# Patient Record
Sex: MALE | Race: ASIAN | NOT HISPANIC OR LATINO | URBAN - METROPOLITAN AREA
[De-identification: names, ages, dates, MRNs, and addresses within clinical notes are randomized per-mention and may not be internally consistent; named-entity substitution may affect disease eponyms.]

---
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
## 2021-01-05 ENCOUNTER — NON-APPOINTMENT (OUTPATIENT)
Age: 21
End: 2021-01-05

## 2021-01-06 ENCOUNTER — NON-APPOINTMENT (OUTPATIENT)
Age: 21
End: 2021-01-06

## 2021-01-07 ENCOUNTER — NON-APPOINTMENT (OUTPATIENT)
Age: 21
End: 2021-01-07

## 2021-01-07 RX ORDER — CLONIDINE HYDROCHLORIDE 0.1 MG/1
0.1 TABLET ORAL TWICE DAILY
Qty: 60 | Refills: 1 | Status: COMPLETED | COMMUNITY
Start: 2020-11-04 | End: 2021-01-07

## 2021-07-15 ENCOUNTER — APPOINTMENT (OUTPATIENT)
Dept: PEDIATRIC DEVELOPMENTAL SERVICES | Facility: CLINIC | Age: 21
End: 2021-07-15
Payer: MEDICAID

## 2021-07-15 DIAGNOSIS — F84.0 AUTISTIC DISORDER: ICD-10-CM

## 2021-07-15 DIAGNOSIS — Q99.9 CHROMOSOMAL ABNORMALITY, UNSPECIFIED: ICD-10-CM

## 2021-07-15 DIAGNOSIS — F79 UNSPECIFIED INTELLECTUAL DISABILITIES: ICD-10-CM

## 2021-07-15 DIAGNOSIS — F91.3 OPPOSITIONAL DEFIANT DISORDER: ICD-10-CM

## 2021-07-15 DIAGNOSIS — F90.2 ATTENTION-DEFICIT HYPERACTIVITY DISORDER, COMBINED TYPE: ICD-10-CM

## 2021-07-15 PROCEDURE — 99215 OFFICE O/P EST HI 40 MIN: CPT | Mod: 95

## 2021-08-13 ENCOUNTER — NON-APPOINTMENT (OUTPATIENT)
Age: 21
End: 2021-08-13

## 2021-09-21 ENCOUNTER — NON-APPOINTMENT (OUTPATIENT)
Age: 21
End: 2021-09-21

## 2021-09-30 ENCOUNTER — NON-APPOINTMENT (OUTPATIENT)
Age: 21
End: 2021-09-30

## 2021-10-19 ENCOUNTER — NON-APPOINTMENT (OUTPATIENT)
Age: 21
End: 2021-10-19

## 2021-10-28 ENCOUNTER — NON-APPOINTMENT (OUTPATIENT)
Age: 21
End: 2021-10-28

## 2021-11-04 ENCOUNTER — NON-APPOINTMENT (OUTPATIENT)
Age: 21
End: 2021-11-04

## 2021-11-12 ENCOUNTER — NON-APPOINTMENT (OUTPATIENT)
Age: 21
End: 2021-11-12

## 2022-01-25 ENCOUNTER — NON-APPOINTMENT (OUTPATIENT)
Age: 22
End: 2022-01-25

## 2022-01-25 RX ORDER — LISDEXAMFETAMINE DIMESYLATE 70 MG/1
70 CAPSULE ORAL
Qty: 30 | Refills: 0 | Status: COMPLETED | COMMUNITY
Start: 2021-01-07 | End: 2022-01-25

## 2022-01-27 ENCOUNTER — NON-APPOINTMENT (OUTPATIENT)
Age: 22
End: 2022-01-27

## 2022-02-01 ENCOUNTER — NON-APPOINTMENT (OUTPATIENT)
Age: 22
End: 2022-02-01

## 2022-02-02 ENCOUNTER — NON-APPOINTMENT (OUTPATIENT)
Age: 22
End: 2022-02-02

## 2022-02-04 ENCOUNTER — NON-APPOINTMENT (OUTPATIENT)
Age: 22
End: 2022-02-04

## 2022-02-04 ENCOUNTER — EMERGENCY (EMERGENCY)
Facility: HOSPITAL | Age: 22
LOS: 1 days | Discharge: HOME | End: 2022-02-04
Attending: EMERGENCY MEDICINE | Admitting: EMERGENCY MEDICINE
Payer: MEDICAID

## 2022-02-04 VITALS
SYSTOLIC BLOOD PRESSURE: 121 MMHG | TEMPERATURE: 98 F | HEIGHT: 60 IN | OXYGEN SATURATION: 100 % | RESPIRATION RATE: 16 BRPM | HEART RATE: 78 BPM | DIASTOLIC BLOOD PRESSURE: 91 MMHG

## 2022-02-04 VITALS
OXYGEN SATURATION: 100 % | TEMPERATURE: 98 F | DIASTOLIC BLOOD PRESSURE: 77 MMHG | HEART RATE: 79 BPM | SYSTOLIC BLOOD PRESSURE: 125 MMHG | RESPIRATION RATE: 18 BRPM

## 2022-02-04 LAB
ALBUMIN SERPL ELPH-MCNC: 4.8 G/DL — SIGNIFICANT CHANGE UP (ref 3.3–5)
ALP SERPL-CCNC: 63 U/L — SIGNIFICANT CHANGE UP (ref 40–120)
ALT FLD-CCNC: 27 U/L — SIGNIFICANT CHANGE UP (ref 4–41)
ANION GAP SERPL CALC-SCNC: 16 MMOL/L — HIGH (ref 7–14)
AST SERPL-CCNC: 52 U/L — HIGH (ref 4–40)
BASOPHILS # BLD AUTO: 0.03 K/UL — SIGNIFICANT CHANGE UP (ref 0–0.2)
BASOPHILS NFR BLD AUTO: 0.4 % — SIGNIFICANT CHANGE UP (ref 0–2)
BILIRUB SERPL-MCNC: 0.7 MG/DL — SIGNIFICANT CHANGE UP (ref 0.2–1.2)
BUN SERPL-MCNC: 8 MG/DL — SIGNIFICANT CHANGE UP (ref 7–23)
CALCIUM SERPL-MCNC: 10.7 MG/DL — HIGH (ref 8.4–10.5)
CHLORIDE SERPL-SCNC: 100 MMOL/L — SIGNIFICANT CHANGE UP (ref 98–107)
CO2 SERPL-SCNC: 25 MMOL/L — SIGNIFICANT CHANGE UP (ref 22–31)
CREAT SERPL-MCNC: 0.8 MG/DL — SIGNIFICANT CHANGE UP (ref 0.5–1.3)
EOSINOPHIL # BLD AUTO: 0.09 K/UL — SIGNIFICANT CHANGE UP (ref 0–0.5)
EOSINOPHIL NFR BLD AUTO: 1.2 % — SIGNIFICANT CHANGE UP (ref 0–6)
GLUCOSE SERPL-MCNC: 99 MG/DL — SIGNIFICANT CHANGE UP (ref 70–99)
HCT VFR BLD CALC: 46.5 % — SIGNIFICANT CHANGE UP (ref 39–50)
HGB BLD-MCNC: 15.7 G/DL — SIGNIFICANT CHANGE UP (ref 13–17)
IANC: 3.85 K/UL — SIGNIFICANT CHANGE UP (ref 1.5–8.5)
IMM GRANULOCYTES NFR BLD AUTO: 0.1 % — SIGNIFICANT CHANGE UP (ref 0–1.5)
LIDOCAIN IGE QN: 42 U/L — SIGNIFICANT CHANGE UP (ref 7–60)
LYMPHOCYTES # BLD AUTO: 2.99 K/UL — SIGNIFICANT CHANGE UP (ref 1–3.3)
LYMPHOCYTES # BLD AUTO: 38.4 % — SIGNIFICANT CHANGE UP (ref 13–44)
MCHC RBC-ENTMCNC: 29.1 PG — SIGNIFICANT CHANGE UP (ref 27–34)
MCHC RBC-ENTMCNC: 33.8 GM/DL — SIGNIFICANT CHANGE UP (ref 32–36)
MCV RBC AUTO: 86.3 FL — SIGNIFICANT CHANGE UP (ref 80–100)
MONOCYTES # BLD AUTO: 0.82 K/UL — SIGNIFICANT CHANGE UP (ref 0–0.9)
MONOCYTES NFR BLD AUTO: 10.5 % — SIGNIFICANT CHANGE UP (ref 2–14)
NEUTROPHILS # BLD AUTO: 3.85 K/UL — SIGNIFICANT CHANGE UP (ref 1.8–7.4)
NEUTROPHILS NFR BLD AUTO: 49.4 % — SIGNIFICANT CHANGE UP (ref 43–77)
NRBC # BLD: 0 /100 WBCS — SIGNIFICANT CHANGE UP
NRBC # FLD: 0 K/UL — SIGNIFICANT CHANGE UP
PLATELET # BLD AUTO: 218 K/UL — SIGNIFICANT CHANGE UP (ref 150–400)
POTASSIUM SERPL-MCNC: 5.2 MMOL/L — SIGNIFICANT CHANGE UP (ref 3.5–5.3)
POTASSIUM SERPL-SCNC: 5.2 MMOL/L — SIGNIFICANT CHANGE UP (ref 3.5–5.3)
PROT SERPL-MCNC: 8.4 G/DL — HIGH (ref 6–8.3)
RBC # BLD: 5.39 M/UL — SIGNIFICANT CHANGE UP (ref 4.2–5.8)
RBC # FLD: 14.3 % — SIGNIFICANT CHANGE UP (ref 10.3–14.5)
SARS-COV-2 RNA SPEC QL NAA+PROBE: SIGNIFICANT CHANGE UP
SODIUM SERPL-SCNC: 141 MMOL/L — SIGNIFICANT CHANGE UP (ref 135–145)
WBC # BLD: 7.79 K/UL — SIGNIFICANT CHANGE UP (ref 3.8–10.5)
WBC # FLD AUTO: 7.79 K/UL — SIGNIFICANT CHANGE UP (ref 3.8–10.5)

## 2022-02-04 PROCEDURE — 99285 EMERGENCY DEPT VISIT HI MDM: CPT

## 2022-02-04 PROCEDURE — 74177 CT ABD & PELVIS W/CONTRAST: CPT | Mod: 26,MA

## 2022-02-04 RX ORDER — SODIUM CHLORIDE 9 MG/ML
1000 INJECTION, SOLUTION INTRAVENOUS ONCE
Refills: 0 | Status: COMPLETED | OUTPATIENT
Start: 2022-02-04 | End: 2022-02-04

## 2022-02-04 RX ORDER — FAMOTIDINE 10 MG/ML
20 INJECTION INTRAVENOUS ONCE
Refills: 0 | Status: COMPLETED | OUTPATIENT
Start: 2022-02-04 | End: 2022-02-04

## 2022-02-04 RX ORDER — ACETAMINOPHEN 500 MG
1000 TABLET ORAL ONCE
Refills: 0 | Status: COMPLETED | OUTPATIENT
Start: 2022-02-04 | End: 2022-02-04

## 2022-02-04 RX ORDER — MINERAL OIL
133 OIL (ML) MISCELLANEOUS ONCE
Refills: 0 | Status: COMPLETED | OUTPATIENT
Start: 2022-02-04 | End: 2022-02-04

## 2022-02-04 RX ADMIN — SODIUM CHLORIDE 1000 MILLILITER(S): 9 INJECTION, SOLUTION INTRAVENOUS at 18:43

## 2022-02-04 RX ADMIN — Medication 1000 MILLIGRAM(S): at 19:34

## 2022-02-04 RX ADMIN — Medication 133 MILLILITER(S): at 21:49

## 2022-02-04 RX ADMIN — FAMOTIDINE 20 MILLIGRAM(S): 10 INJECTION INTRAVENOUS at 18:43

## 2022-02-04 RX ADMIN — Medication 400 MILLIGRAM(S): at 18:44

## 2022-02-04 NOTE — ED ADULT NURSE NOTE - NSIMPLEMENTINTERV_GEN_ALL_ED
Implemented All Universal Safety Interventions:  [place] to call system. Call bell, personal items and telephone within reach. Instruct patient to call for assistance. Room bathroom lighting operational. Non-slip footwear when patient is off stretcher. Physically safe environment: no spills, clutter or unnecessary equipment. Stretcher in lowest position, wheels locked, appropriate side rails in place.

## 2022-02-04 NOTE — ED ADULT NURSE NOTE - OBJECTIVE STATEMENT
pt received to intake c/o mid abdominal pain starting today with nausea and vomiting and headache. pt alert and oriented, noter to be developmentally delayed with slow speech and drooling. respirations even and unlabored. pt unable to elaborate on symptoms stating "I don't feel good." Abdomen soft, nondistended, symmetrical. 20G PIV placed to right forearm. labs collected and sent. due medications given. safety maintained. no family present at bedside at time of RN exam and interview. pending CT scan.

## 2022-02-04 NOTE — ED PROVIDER NOTE - CONDITION AT DISCHARGE:
Patient left message on clinic voicemail regarding test results.    It is okay to leave a detailed message on voicemail.   Satisfactory

## 2022-02-04 NOTE — ED PROVIDER NOTE - PROGRESS NOTE DETAILS
Merrill, PGY2: Patient's mother requesting to leave so that she can care for her infant child. Telephone number [phone]. States patient usually requires a 1-1. Given need for enhance supervision, patient will be moved to the main DD ED ATTG:  CT shows constip/fecal impaction.  D/w MO phone  - he's been taking miralax and has GI.  Given sigmoid stool burden will need disimpaction and enema.  D/w MO she's agreeable.  Afterwards will PO chal and if passes can d/c home f/u GI as outpt. KYM Urbina: Pt was given an enema, had a large brown BM, tolerated PO (full tray of dinner), will D/C with outpatient follow up. Advised pt's mother pt is ready for D/C, states she will pick the pt up in 1 hour.

## 2022-02-04 NOTE — ED PROVIDER NOTE - CLINICAL SUMMARY MEDICAL DECISION MAKING FREE TEXT BOX
2yo M w/ history of autism, chrohns, angelman syndrome coming in for several days of abdominal pain, headache and intermittent dizziness; will evaluate for electrolyte abnormalities vs Crohn's flare-up vs other acute intra abdominal pathology. 20yo M w/ history of autism, chrohns, angelman syndrome coming in for several days of abdominal pain, headache and intermittent dizziness; will evaluate for electrolyte abnormalities vs Crohn's flare-up vs other acute intra abdominal pathology.

## 2022-02-04 NOTE — ED PROVIDER NOTE - PHYSICAL EXAMINATION
GENERAL: well appearing in no acute distress, non-toxic appearing; : non-verbal but nods yes/no  HEAD: normocephalic, atraumatic  HENT: airway intact, neck supple  EYES: normal conjunctiva, EOMI, PERRL  CARDIAC: regular rate and rhythm, normal S1S2, no appreciable murmurs, 2+ pulses in UE/LE b/l  PULM: normal breath sounds, clear to ascultation bilaterally, no rales, rhonchi, wheezing  GI: abdomen nondistended, soft, nontender, no guarding, rebound tenderness  NEURO: no focal motor or sensory deficits  MSK: no peripheral edema, no calf tenderness b/l  SKIN: well-perfused, extremities warm, no visible rashes GENERAL: well appearing in no acute distress, non-toxic appearing; : non-verbal but nods yes/no  HEAD: normocephalic, atraumatic  HENT: airway intact, neck supple  EYES: normal conjunctiva, EOMI, PERRL  CARDIAC: regular rate and rhythm, normal S1S2, no appreciable murmurs, 2+ pulses in UE/LE b/l  PULM: normal breath sounds, clear to ascultation bilaterally, no rales, rhonchi, wheezing  GI: abdomen nondistended, soft, nontender, no guarding, rebound tenderness  NEURO: no focal motor or sensory deficits  MSK: no peripheral edema, no calf tenderness b/l  SKIN: well-perfused, extremities warm, no visible rashes    PA Valane: Rectal: Soft brown stool in the vault.

## 2022-02-04 NOTE — ED PROVIDER NOTE - NSFOLLOWUPINSTRUCTIONS_ED_ALL_ED_FT
You were given an enema and had a manual disimpaction and had a large bowel movement. Follow up with your primary doctor and Gastroenterologist. Bring this packet which includes copies of your results.  Advance activity as tolerated.  Continue all previously prescribed medications as directed.  Follow up with your primary care physician in 48-72 hours- bring copies of your results.  Return to the ER for worsening or persistent symptoms, and/or ANY NEW OR CONCERNING SYMPTOMS THIS INCLUDES BUT IS NOT LIMITED TO NAUSEA, VOMITING, FEVER, CHILLS, NIGHTSWEATS, INABILITY TO EAT OR DRINK OR FOR ANY OTHER SYMPTOMS THAT CONCERN YOU. If you have issues obtaining follow up, please call: [phone] to obtain a doctor or specialist who takes your insurance in your area.  You may call [phone] to make an appointment with the internal medicine clinic.

## 2022-02-04 NOTE — ED PROVIDER NOTE - ATTENDING CONTRIBUTION TO CARE
21M p/w multiple complaints.  Headache, tried tylenol without improvement.  ALso c/o dizziness, poor oral intake x 1 day, poor sleep.  Recently started haldol liquid to help him sleep - 1mL - and then another mL if it didn't work by his behavioral health doctor here at [facility].  Per MO he's agitated at night, pacing down the carbajal, peeing on himself.  MO also noted new onset of drooling.  Pt went to [facility] x 2 last 2 days as well and seems he was evaluated for behavioral problem, one time he called himself.  MO doesn't think any imaging were done.  Pt ate pasta yesterday but not today.  Drinking some water.  Dissimilar to previous crohn's dz exacerbations which usually were a/w fever and diarrhea.  No trauma or fever.  PMHX autism MR gretel dz, seizure disorder.   All Motrin.  Pt in no distress, calm and cooperative.  MMM.  mild distension of abd.  Periumbilical ttp.  Eval for appx.  Rx fluids, check labs, reass.  EKG SR at 75 no shana no std no twi.   qtc 419.  If no acute finding, PO trial.  D/w Mo recc d/c haldol -she should d/w pt's doctor.  Ambulatory in no distress.  MO phone   VS:  unremarkable    GEN - NAD; malaise, drooling   A+O x3.  Intellectual disability.  Follows commands.  HEAD - NC/AT     ENT - PEERL, EOMI, mucous membranes    moist , no discharge      NECK: Neck supple, non-tender without lymphadenopathy, no masses, no JVD  PULM - CTA b/l,  symmetric breath sounds  COR -  normal heart sounds    ABD - , ND, mild diffuse ttp, soft,  BACK - no CVA tenderness, nontender spine     EXTREMS - no edema, no deformity, warm and well perfused    SKIN - no rash    or bruising      NEUROLOGIC - alert, face symmetric, speech fluent, sensation nl, motor no focal deficit.

## 2022-02-04 NOTE — ED PROVIDER NOTE - OBJECTIVE STATEMENT
22yo M w/ history of autism, chrohns, angelman syndrome coming in for several days of abdominal pain, headache and intermittent dizziness. Symptoms seem to have progressed after grandmother was hospitalized, as he was "very attached to her" per the mother. Patient was seen at [facility] twice in the past two days and was "discharged after getting an ekg". Patient recently started on haldol for insomnia and behavioral disturbance w/ minimal relief. Mother denies any fevers, chills diarrhea or weight. Has otherwise been in his normal state of health; no chest pain SOB, cough or rhinorrhea.

## 2022-02-04 NOTE — ED PROVIDER NOTE - NSICDXPASTMEDICALHX_GEN_ALL_CORE_FT
PAST MEDICAL HISTORY:  Angelman syndrome     Autism     Crohn's disease      PAST MEDICAL HISTORY:  Angelman syndrome     Autism     Crohn's disease     Seizure disorder

## 2022-02-04 NOTE — ED ADULT TRIAGE NOTE - CHIEF COMPLAINT QUOTE
Pt mother states that pt was started on Haldol 1 week ago and since then pt has been complaining of headache, dizziness, abd pain, not eating and not sleeping.  PMH autism

## 2022-02-04 NOTE — ED PROVIDER NOTE - PATIENT PORTAL LINK FT
You can access the FollowMyHealth Patient Portal offered by [facility] by registering at the following website: [email or web address]. By joining [employer]’s FollowMyHealth portal, you will also be able to view your health information using other applications (apps) compatible with our system.

## 2022-02-08 ENCOUNTER — NON-APPOINTMENT (OUTPATIENT)
Age: 22
End: 2022-02-08

## 2022-02-08 RX ORDER — HALOPERIDOL 2 MG/ML
2 SOLUTION, CONCENTRATE ORAL TWICE DAILY
Qty: 60 | Refills: 0 | Status: COMPLETED | COMMUNITY
Start: 2021-11-04 | End: 2022-02-08

## 2022-02-14 ENCOUNTER — NON-APPOINTMENT (OUTPATIENT)
Age: 22
End: 2022-02-14

## 2022-02-16 ENCOUNTER — NON-APPOINTMENT (OUTPATIENT)
Age: 22
End: 2022-02-16

## 2022-02-17 ENCOUNTER — NON-APPOINTMENT (OUTPATIENT)
Age: 22
End: 2022-02-17

## 2022-03-01 ENCOUNTER — NON-APPOINTMENT (OUTPATIENT)
Age: 22
End: 2022-03-01

## 2022-03-10 ENCOUNTER — NON-APPOINTMENT (OUTPATIENT)
Age: 22
End: 2022-03-10

## 2022-03-10 RX ORDER — DIVALPROEX SODIUM 500 1/1
500 TABLET, EXTENDED RELEASE ORAL
Qty: 30 | Refills: 0 | Status: ACTIVE | COMMUNITY
Start: 2022-03-01 | End: 1900-01-01

## 2022-03-21 PROBLEM — Q93.51 ANGELMAN SYNDROME: Chronic | Status: ACTIVE | Noted: 2022-02-04

## 2022-03-21 PROBLEM — G40.909 EPILEPSY, UNSPECIFIED, NOT INTRACTABLE, WITHOUT STATUS EPILEPTICUS: Chronic | Status: ACTIVE | Noted: 2022-02-05

## 2022-03-21 PROBLEM — K50.90 CROHN'S DISEASE, UNSPECIFIED, WITHOUT COMPLICATIONS: Chronic | Status: ACTIVE | Noted: 2022-02-04

## 2022-03-21 PROBLEM — F84.0 AUTISTIC DISORDER: Chronic | Status: ACTIVE | Noted: 2022-02-04

## 2022-03-30 ENCOUNTER — NON-APPOINTMENT (OUTPATIENT)
Age: 22
End: 2022-03-30

## 2022-04-05 ENCOUNTER — NON-APPOINTMENT (OUTPATIENT)
Age: 22
End: 2022-04-05

## 2022-09-10 ENCOUNTER — EMERGENCY (EMERGENCY)
Facility: HOSPITAL | Age: 22
LOS: 1 days | Discharge: HOME | End: 2022-09-10
Admitting: EMERGENCY MEDICINE

## 2022-09-10 VITALS
TEMPERATURE: 97 F | RESPIRATION RATE: 17 BRPM | SYSTOLIC BLOOD PRESSURE: 136 MMHG | HEART RATE: 103 BPM | DIASTOLIC BLOOD PRESSURE: 84 MMHG | OXYGEN SATURATION: 100 %

## 2022-09-10 VITALS
TEMPERATURE: 97 F | OXYGEN SATURATION: 99 % | RESPIRATION RATE: 16 BRPM | HEIGHT: 60 IN | DIASTOLIC BLOOD PRESSURE: 94 MMHG | HEART RATE: 114 BPM | SYSTOLIC BLOOD PRESSURE: 128 MMHG

## 2022-09-10 DIAGNOSIS — F84.0 AUTISTIC DISORDER: ICD-10-CM

## 2022-09-10 LAB
ALBUMIN SERPL ELPH-MCNC: 4.7 G/DL — SIGNIFICANT CHANGE UP (ref 3.3–5)
ALP SERPL-CCNC: 59 U/L — SIGNIFICANT CHANGE UP (ref 40–120)
ALT FLD-CCNC: 25 U/L — SIGNIFICANT CHANGE UP (ref 4–41)
ANION GAP SERPL CALC-SCNC: 12 MMOL/L — SIGNIFICANT CHANGE UP (ref 7–14)
APAP SERPL-MCNC: <10 UG/ML — LOW (ref 15–25)
AST SERPL-CCNC: 29 U/L — SIGNIFICANT CHANGE UP (ref 4–40)
BASOPHILS # BLD AUTO: 0.01 K/UL — SIGNIFICANT CHANGE UP (ref 0–0.2)
BASOPHILS NFR BLD AUTO: 0.1 % — SIGNIFICANT CHANGE UP (ref 0–2)
BILIRUB SERPL-MCNC: 0.5 MG/DL — SIGNIFICANT CHANGE UP (ref 0.2–1.2)
BUN SERPL-MCNC: 8 MG/DL — SIGNIFICANT CHANGE UP (ref 7–23)
CALCIUM SERPL-MCNC: 9.7 MG/DL — SIGNIFICANT CHANGE UP (ref 8.4–10.5)
CHLORIDE SERPL-SCNC: 98 MMOL/L — SIGNIFICANT CHANGE UP (ref 98–107)
CO2 SERPL-SCNC: 28 MMOL/L — SIGNIFICANT CHANGE UP (ref 22–31)
CREAT SERPL-MCNC: 0.82 MG/DL — SIGNIFICANT CHANGE UP (ref 0.5–1.3)
EGFR: 128 ML/MIN/1.73M2 — SIGNIFICANT CHANGE UP
EOSINOPHIL # BLD AUTO: 0.04 K/UL — SIGNIFICANT CHANGE UP (ref 0–0.5)
EOSINOPHIL NFR BLD AUTO: 0.5 % — SIGNIFICANT CHANGE UP (ref 0–6)
ETHANOL SERPL-MCNC: <10 MG/DL — SIGNIFICANT CHANGE UP
GLUCOSE SERPL-MCNC: 122 MG/DL — HIGH (ref 70–99)
HCT VFR BLD CALC: 47.5 % — SIGNIFICANT CHANGE UP (ref 39–50)
HGB BLD-MCNC: 16.1 G/DL — SIGNIFICANT CHANGE UP (ref 13–17)
IANC: 4.89 K/UL — SIGNIFICANT CHANGE UP (ref 1.8–7.4)
IMM GRANULOCYTES NFR BLD AUTO: 0.3 % — SIGNIFICANT CHANGE UP (ref 0–1.5)
LYMPHOCYTES # BLD AUTO: 2.38 K/UL — SIGNIFICANT CHANGE UP (ref 1–3.3)
LYMPHOCYTES # BLD AUTO: 27.4 % — SIGNIFICANT CHANGE UP (ref 13–44)
MCHC RBC-ENTMCNC: 27.6 PG — SIGNIFICANT CHANGE UP (ref 27–34)
MCHC RBC-ENTMCNC: 33.9 GM/DL — SIGNIFICANT CHANGE UP (ref 32–36)
MCV RBC AUTO: 81.5 FL — SIGNIFICANT CHANGE UP (ref 80–100)
MONOCYTES # BLD AUTO: 1.35 K/UL — HIGH (ref 0–0.9)
MONOCYTES NFR BLD AUTO: 15.5 % — HIGH (ref 2–14)
NEUTROPHILS # BLD AUTO: 4.89 K/UL — SIGNIFICANT CHANGE UP (ref 1.8–7.4)
NEUTROPHILS NFR BLD AUTO: 56.2 % — SIGNIFICANT CHANGE UP (ref 43–77)
NRBC # BLD: 0 /100 WBCS — SIGNIFICANT CHANGE UP (ref 0–0)
NRBC # FLD: 0 K/UL — SIGNIFICANT CHANGE UP (ref 0–0)
PLATELET # BLD AUTO: 318 K/UL — SIGNIFICANT CHANGE UP (ref 150–400)
POTASSIUM SERPL-MCNC: 3.7 MMOL/L — SIGNIFICANT CHANGE UP (ref 3.5–5.3)
POTASSIUM SERPL-SCNC: 3.7 MMOL/L — SIGNIFICANT CHANGE UP (ref 3.5–5.3)
PROT SERPL-MCNC: 8.5 G/DL — HIGH (ref 6–8.3)
RBC # BLD: 5.83 M/UL — HIGH (ref 4.2–5.8)
RBC # FLD: 13.2 % — SIGNIFICANT CHANGE UP (ref 10.3–14.5)
SALICYLATES SERPL-MCNC: <0.3 MG/DL — LOW (ref 15–30)
SODIUM SERPL-SCNC: 138 MMOL/L — SIGNIFICANT CHANGE UP (ref 135–145)
TOXICOLOGY SCREEN, DRUGS OF ABUSE, SERUM RESULT: SIGNIFICANT CHANGE UP
TSH SERPL-MCNC: 1.05 UIU/ML — SIGNIFICANT CHANGE UP (ref 0.27–4.2)
WBC # BLD: 8.7 K/UL — SIGNIFICANT CHANGE UP (ref 3.8–10.5)
WBC # FLD AUTO: 8.7 K/UL — SIGNIFICANT CHANGE UP (ref 3.8–10.5)

## 2022-09-10 PROCEDURE — 99285 EMERGENCY DEPT VISIT HI MDM: CPT

## 2022-09-10 PROCEDURE — 90792 PSYCH DIAG EVAL W/MED SRVCS: CPT | Mod: GC

## 2022-09-10 PROCEDURE — 93010 ELECTROCARDIOGRAM REPORT: CPT

## 2022-09-10 NOTE — ED PROVIDER NOTE - NSICDXPASTMEDICALHX_GEN_ALL_CORE_FT
PAST MEDICAL HISTORY:  Angelman syndrome     Autism     Bipolar disorder     Crohn's disease     Seizure disorder
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
## 2022-09-10 NOTE — ED BEHAVIORAL HEALTH ASSESSMENT NOTE - DESCRIPTION
Lives with mother, grandparents, 2 siblings, no substance use, in outpatient care, HS graduate (special ed), in process of group home placement 10/12/22 ASD, GI issues ICU Vital Signs Last 24 Hrs  T(C): 36.2 (10 Sep 2022 19:19), Max: 36.3 (10 Sep 2022 14:31)  T(F): 97.2 (10 Sep 2022 19:19), Max: 97.3 (10 Sep 2022 14:31)  HR: 103 (10 Sep 2022 19:19) (103 - 114)  BP: 136/84 (10 Sep 2022 19:19) (128/94 - 136/84)  BP(mean): --  ABP: --  ABP(mean): --  RR: 17 (10 Sep 2022 19:19) (16 - 17)  SpO2: 100% (10 Sep 2022 19:19) (99% - 100%)    O2 Parameters below as of 10 Sep 2022 19:19  Patient On (Oxygen Delivery Method): room air

## 2022-09-10 NOTE — ED BEHAVIORAL HEALTH ASSESSMENT NOTE - SUMMARY
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 Dela Cruz ([phone]) at [facility] (first appt was 8/24/22),  recent med admissions to [place] [facility] for behavioral issues, on Abilify  mg q4w (received this week at [facility], unclear exact date), history of behavioral disturbance in contexts of routine changes or when family members are ill and requiring multiple days away from home (last in February 2022, and now), whose mother wanted pt psychiatrically/medically evaluated today as this morning he had been drooling, with diarrhea, and anxiety.    On assessment, patient not meeting criteria for any formal psychiatric decompensation in terms of a mood disorder, psychotic disorder, or anxiety disorder. He does carry a diagnosis of Bipolar disorder but is in treatment. No evidence of overt maday on assessment. Per collateral report/patient account, there is a low frustration tolerance (likely related to ASD/neurodevelopmental diathesis), in the context of his grandmother, whom he is very attached with, not being present in the home due to medical illnesses which has lead to behavioral outbursts and proclamation of HI though without any known method/plan/intent. Mother does not feel he represents an acute danger but wanted to make sure there wasn't any medical or psychiatric process occurring due to medications being given to the patient including an reported ORR. Patient without EPS symptoms on assessment and without acute medical concerns per ED provider. He is psychiatrically and medically clear for discharge. There is no indication or for hospitalization and further has extensive outpatient plans that are underway. Mother knows to call 911, activate EMS, or bring pt to an ED if concerns develop.

## 2022-09-10 NOTE — ED BEHAVIORAL HEALTH ASSESSMENT NOTE - NSBHATTESTCOMMENTATTENDFT_PSY_A_CORE
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 Dela Cruz ([phone]) at [facility] (first appt was 8/24/22),  recent med admissions to [facility] for behavioral issues, on Abilify  mg q4w (received this week at [facility], unclear exact date), history of behavioral disturbance in contexts of routine changes or when family members are ill and requiring multiple days away from home (last in February 2022, and now), whose mother wanted pt psychiatrically/medically evaluated today as this morning he had been drooling, with diarrhea, and anxiety.  Pt. evaluated along with pgy-3 resident . Pt. is calm and cooperative during evaluation. He reports he came to hospital for nausea. He also mentioned dreaming dead people in his sleep. Collateral from mother denies any safety concerns. No behavioral problems or agitation was noted during admission. Pt. does not meet criteria for voluntary admission. Pt. will be discharged back home with his mother. Safety plan completed. Mother is aware to bring him back to ER in case of emergency.
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
## 2022-09-10 NOTE — ED ADULT NURSE NOTE - OBJECTIVE STATEMENT
23 [facility] mother called 911 stating the pt has been "having outbursts since being started on Abilify." PMH: bipolar disorder, autism, Angelman's syndrome. Pt a&ox4, ambulatory, neg SOB, denies CP, NAD noted. Pt endorses vomiting and 10 episodes of diarrhea today. Denies SI/HI, endorses A/V hallucinations, states "I see dead people and they're after me." Cooperative at this time. PO challenge passed. Safety maintained. Will continue to monitor.

## 2022-09-10 NOTE — ED ADULT NURSE NOTE - CHIEF COMPLAINT QUOTE
Pt c/o weakness since being discharged from [facility] yesterday. As per mother "he has been having behavioral outbursts recently and was started on Abilify, since Wednesday has had diarrhea, weakness." PMHx: autism, Angelman's syndrome. Pt currently calm and cooperative.    Addendum: As per mother "pt recently diagnosed with bipolar and has been experiencing hallucinations since starting abilify." Pt denies SI/HI, endorses A/V hallucinations, stating "I am seeing dead people, they tell me they are after me."

## 2022-09-10 NOTE — ED PROVIDER NOTE - PATIENT PORTAL LINK FT
You can access the FollowMyHealth Patient Portal offered by [facility] by registering at the following website: [email or web address]. By joining [employer]’s FollowMyHealth portal, you will also be able to view your health information using other applications (apps) compatible with our system.

## 2022-09-10 NOTE — ED ADULT NURSE NOTE - EXTENSIONS OF SELF_ADULT
[Respiratory Effort] : normal respiratory effort [Calm] : calm [de-identified] : Soft, nontender, nondistended.  Healthy colostomy in place. [de-identified] : 1 cm opening at the most anterior aspect of his perineal incision with granulation tissue.  Granulation tissue was removed and the wound treated with silver nitrate.  There is no surrounding erythema or fluctuance. [de-identified] : Well-appearing, in no distress [de-identified] : Normocephalic, atraumatic [de-identified] : Moves extremities without difficulty [de-identified] : Warm and dry [de-identified] : Alert and oriented x3 None

## 2022-09-10 NOTE — ED ADULT TRIAGE NOTE - CHIEF COMPLAINT QUOTE
Pt c/o weakness since being discharged from [facility] yesterday. As per mother "he has been having behavioral outbursts recently and was started on Abilify, since Wednesday has had diarrhea, weakness." PMHx: autism, Angelman's syndrome. Pt currently calm and cooperative. Pt c/o weakness since being discharged from [facility] yesterday. As per mother "he has been having behavioral outbursts recently and was started on Abilify, since Wednesday has had diarrhea, weakness." PMHx: autism, Angelman's syndrome. Pt currently calm and cooperative.    Addendum: As per mother "pt recently diagnosed with bipolar and has been experiencing hallucinations since starting abilify." Pt denies SI/HI, endorses A/V hallucinations, stating "I am seeing dead people, they tell me they are after me."

## 2022-09-10 NOTE — ED BEHAVIORAL HEALTH ASSESSMENT NOTE - PAST PSYCHOTROPIC MEDICATION
Risperdal - couldn't tolerate side-effects, concern for gynecomastia   Haldol - patient overly sedated/drooling

## 2022-09-10 NOTE — ED ADULT NURSE REASSESSMENT NOTE - NS ED NURSE REASSESS COMMENT FT1
Pt resting in bed, NAD noted. No episodes of vomiting or diarrhea since arrival to hospital. Provided dinner. Safety maintained. Pending dispo. No

## 2022-09-10 NOTE — ED PROVIDER NOTE - OBJECTIVE STATEMENT
23 y/o M hx Autism, Bipolar D/O, Impulse Control D/O  BIBA secondary to agitation and  acting out  behaviour.   Denies SI/HI/AV/VH. Denies falling,  punching or kicking any objects. Denies pain , dizziness, SOB, fever, chills, chest/ abdominal discomfort.  Denies  recent use of alcohol or illicit drugs. No evidence of physical injuries, broken skin or deformities.

## 2022-09-10 NOTE — ED BEHAVIORAL HEALTH ASSESSMENT NOTE - RISK ASSESSMENT
Low Acute Suicide Risk Chronic: ASD dx, chronic medical issues, male gender, low frustration tolerance  Acute: agitation in context of routine change, recent medical hospitalizations  Protective: supportive family, stable domicile, outpatient MH/med care

## 2022-09-11 ENCOUNTER — INPATIENT (INPATIENT)
Facility: HOSPITAL | Age: 22
LOS: 8 days | Discharge: HOME | End: 2022-09-20
Attending: HOSPITALIST | Admitting: HOSPITALIST

## 2022-09-11 VITALS
HEART RATE: 111 BPM | TEMPERATURE: 98 F | SYSTOLIC BLOOD PRESSURE: 127 MMHG | OXYGEN SATURATION: 99 % | RESPIRATION RATE: 16 BRPM | DIASTOLIC BLOOD PRESSURE: 95 MMHG | HEIGHT: 60 IN

## 2022-09-11 LAB
ALBUMIN SERPL ELPH-MCNC: 4.1 G/DL — SIGNIFICANT CHANGE UP (ref 3.3–5)
ALP SERPL-CCNC: 44 U/L — SIGNIFICANT CHANGE UP (ref 40–120)
ALT FLD-CCNC: 27 U/L — SIGNIFICANT CHANGE UP (ref 4–41)
ANION GAP SERPL CALC-SCNC: 11 MMOL/L — SIGNIFICANT CHANGE UP (ref 7–14)
ANION GAP SERPL CALC-SCNC: 16 MMOL/L — HIGH (ref 7–14)
APAP SERPL-MCNC: <10 UG/ML — LOW (ref 15–25)
AST SERPL-CCNC: 69 U/L — HIGH (ref 4–40)
B PERT DNA SPEC QL NAA+PROBE: SIGNIFICANT CHANGE UP
B PERT+PARAPERT DNA PNL SPEC NAA+PROBE: SIGNIFICANT CHANGE UP
BASE EXCESS BLDV CALC-SCNC: 3.1 MMOL/L — HIGH (ref -2–3)
BASOPHILS # BLD AUTO: 0.02 K/UL — SIGNIFICANT CHANGE UP (ref 0–0.2)
BASOPHILS NFR BLD AUTO: 0.2 % — SIGNIFICANT CHANGE UP (ref 0–2)
BILIRUB SERPL-MCNC: 0.4 MG/DL — SIGNIFICANT CHANGE UP (ref 0.2–1.2)
BLOOD GAS VENOUS COMPREHENSIVE RESULT: SIGNIFICANT CHANGE UP
BORDETELLA PARAPERTUSSIS (RAPRVP): SIGNIFICANT CHANGE UP
BUN SERPL-MCNC: 9 MG/DL — SIGNIFICANT CHANGE UP (ref 7–23)
BUN SERPL-MCNC: 9 MG/DL — SIGNIFICANT CHANGE UP (ref 7–23)
C PNEUM DNA SPEC QL NAA+PROBE: SIGNIFICANT CHANGE UP
CALCIUM SERPL-MCNC: 8.5 MG/DL — SIGNIFICANT CHANGE UP (ref 8.4–10.5)
CALCIUM SERPL-MCNC: 9 MG/DL — SIGNIFICANT CHANGE UP (ref 8.4–10.5)
CHLORIDE BLDV-SCNC: 103 MMOL/L — SIGNIFICANT CHANGE UP (ref 96–108)
CHLORIDE SERPL-SCNC: 101 MMOL/L — SIGNIFICANT CHANGE UP (ref 98–107)
CHLORIDE SERPL-SCNC: 99 MMOL/L — SIGNIFICANT CHANGE UP (ref 98–107)
CO2 BLDV-SCNC: 29.9 MMOL/L — HIGH (ref 22–26)
CO2 SERPL-SCNC: 20 MMOL/L — LOW (ref 22–31)
CO2 SERPL-SCNC: 24 MMOL/L — SIGNIFICANT CHANGE UP (ref 22–31)
CREAT SERPL-MCNC: 0.72 MG/DL — SIGNIFICANT CHANGE UP (ref 0.5–1.3)
CREAT SERPL-MCNC: 0.75 MG/DL — SIGNIFICANT CHANGE UP (ref 0.5–1.3)
EGFR: 132 ML/MIN/1.73M2 — SIGNIFICANT CHANGE UP
EGFR: 133 ML/MIN/1.73M2 — SIGNIFICANT CHANGE UP
EOSINOPHIL # BLD AUTO: 0.06 K/UL — SIGNIFICANT CHANGE UP (ref 0–0.5)
EOSINOPHIL NFR BLD AUTO: 0.7 % — SIGNIFICANT CHANGE UP (ref 0–6)
ETHANOL SERPL-MCNC: <10 MG/DL — SIGNIFICANT CHANGE UP
FLUAV SUBTYP SPEC NAA+PROBE: SIGNIFICANT CHANGE UP
FLUBV RNA SPEC QL NAA+PROBE: SIGNIFICANT CHANGE UP
GAS PNL BLDV: 127 MMOL/L — LOW (ref 136–145)
GLUCOSE BLDV-MCNC: 108 MG/DL — HIGH (ref 70–99)
GLUCOSE SERPL-MCNC: 109 MG/DL — HIGH (ref 70–99)
GLUCOSE SERPL-MCNC: 99 MG/DL — SIGNIFICANT CHANGE UP (ref 70–99)
HADV DNA SPEC QL NAA+PROBE: SIGNIFICANT CHANGE UP
HCO3 BLDV-SCNC: 28 MMOL/L — SIGNIFICANT CHANGE UP (ref 22–29)
HCOV 229E RNA SPEC QL NAA+PROBE: SIGNIFICANT CHANGE UP
HCOV HKU1 RNA SPEC QL NAA+PROBE: SIGNIFICANT CHANGE UP
HCOV NL63 RNA SPEC QL NAA+PROBE: SIGNIFICANT CHANGE UP
HCOV OC43 RNA SPEC QL NAA+PROBE: SIGNIFICANT CHANGE UP
HCT VFR BLD CALC: 45.1 % — SIGNIFICANT CHANGE UP (ref 39–50)
HCT VFR BLDA CALC: 48 % — SIGNIFICANT CHANGE UP (ref 39–51)
HGB BLD CALC-MCNC: 15.9 G/DL — SIGNIFICANT CHANGE UP (ref 13–17)
HGB BLD-MCNC: 15.3 G/DL — SIGNIFICANT CHANGE UP (ref 13–17)
HMPV RNA SPEC QL NAA+PROBE: SIGNIFICANT CHANGE UP
HPIV1 RNA SPEC QL NAA+PROBE: SIGNIFICANT CHANGE UP
HPIV2 RNA SPEC QL NAA+PROBE: SIGNIFICANT CHANGE UP
HPIV3 RNA SPEC QL NAA+PROBE: SIGNIFICANT CHANGE UP
HPIV4 RNA SPEC QL NAA+PROBE: SIGNIFICANT CHANGE UP
IANC: 4.47 K/UL — SIGNIFICANT CHANGE UP (ref 1.8–7.4)
IMM GRANULOCYTES NFR BLD AUTO: 0.5 % — SIGNIFICANT CHANGE UP (ref 0–1.5)
LACTATE BLDV-MCNC: 1.8 MMOL/L — SIGNIFICANT CHANGE UP (ref 0.5–2)
LIDOCAIN IGE QN: 49 U/L — SIGNIFICANT CHANGE UP (ref 7–60)
LYMPHOCYTES # BLD AUTO: 2.54 K/UL — SIGNIFICANT CHANGE UP (ref 1–3.3)
LYMPHOCYTES # BLD AUTO: 30.4 % — SIGNIFICANT CHANGE UP (ref 13–44)
M PNEUMO DNA SPEC QL NAA+PROBE: SIGNIFICANT CHANGE UP
MAGNESIUM SERPL-MCNC: 2 MG/DL — SIGNIFICANT CHANGE UP (ref 1.6–2.6)
MCHC RBC-ENTMCNC: 27.7 PG — SIGNIFICANT CHANGE UP (ref 27–34)
MCHC RBC-ENTMCNC: 33.9 GM/DL — SIGNIFICANT CHANGE UP (ref 32–36)
MCV RBC AUTO: 81.7 FL — SIGNIFICANT CHANGE UP (ref 80–100)
MONOCYTES # BLD AUTO: 1.22 K/UL — HIGH (ref 0–0.9)
MONOCYTES NFR BLD AUTO: 14.6 % — HIGH (ref 2–14)
NEUTROPHILS # BLD AUTO: 4.47 K/UL — SIGNIFICANT CHANGE UP (ref 1.8–7.4)
NEUTROPHILS NFR BLD AUTO: 53.6 % — SIGNIFICANT CHANGE UP (ref 43–77)
NRBC # BLD: 0 /100 WBCS — SIGNIFICANT CHANGE UP (ref 0–0)
NRBC # FLD: 0 K/UL — SIGNIFICANT CHANGE UP (ref 0–0)
PCO2 BLDV: 45 MMHG — SIGNIFICANT CHANGE UP (ref 42–55)
PH BLDV: 7.41 — SIGNIFICANT CHANGE UP (ref 7.32–7.43)
PLATELET # BLD AUTO: 283 K/UL — SIGNIFICANT CHANGE UP (ref 150–400)
PO2 BLDV: 91 MMHG — SIGNIFICANT CHANGE UP
POTASSIUM BLDV-SCNC: 9.7 MMOL/L — CRITICAL HIGH (ref 3.5–5.1)
POTASSIUM SERPL-MCNC: 3.8 MMOL/L — SIGNIFICANT CHANGE UP (ref 3.5–5.3)
POTASSIUM SERPL-MCNC: 5.6 MMOL/L — HIGH (ref 3.5–5.3)
POTASSIUM SERPL-SCNC: 3.8 MMOL/L — SIGNIFICANT CHANGE UP (ref 3.5–5.3)
POTASSIUM SERPL-SCNC: 5.6 MMOL/L — HIGH (ref 3.5–5.3)
PROT SERPL-MCNC: 7.2 G/DL — SIGNIFICANT CHANGE UP (ref 6–8.3)
RAPID RVP RESULT: SIGNIFICANT CHANGE UP
RBC # BLD: 5.52 M/UL — SIGNIFICANT CHANGE UP (ref 4.2–5.8)
RBC # FLD: 13.2 % — SIGNIFICANT CHANGE UP (ref 10.3–14.5)
RSV RNA SPEC QL NAA+PROBE: SIGNIFICANT CHANGE UP
RV+EV RNA SPEC QL NAA+PROBE: SIGNIFICANT CHANGE UP
SALICYLATES SERPL-MCNC: 0.4 MG/DL — LOW (ref 15–30)
SAO2 % BLDV: 97.7 % — SIGNIFICANT CHANGE UP
SARS-COV-2 RNA SPEC QL NAA+PROBE: SIGNIFICANT CHANGE UP
SODIUM SERPL-SCNC: 134 MMOL/L — LOW (ref 135–145)
SODIUM SERPL-SCNC: 137 MMOL/L — SIGNIFICANT CHANGE UP (ref 135–145)
TOXICOLOGY SCREEN, DRUGS OF ABUSE, SERUM RESULT: SIGNIFICANT CHANGE UP
TSH SERPL-MCNC: 0.66 UIU/ML — SIGNIFICANT CHANGE UP (ref 0.27–4.2)
WBC # BLD: 8.35 K/UL — SIGNIFICANT CHANGE UP (ref 3.8–10.5)
WBC # FLD AUTO: 8.35 K/UL — SIGNIFICANT CHANGE UP (ref 3.8–10.5)

## 2022-09-11 PROCEDURE — 99285 EMERGENCY DEPT VISIT HI MDM: CPT

## 2022-09-11 PROCEDURE — 71045 X-RAY EXAM CHEST 1 VIEW: CPT | Mod: 26

## 2022-09-11 RX ORDER — ACETAMINOPHEN 500 MG
975 TABLET ORAL ONCE
Refills: 0 | Status: COMPLETED | OUTPATIENT
Start: 2022-09-11 | End: 2022-09-11

## 2022-09-11 RX ORDER — SODIUM CHLORIDE 9 MG/ML
1000 INJECTION INTRAMUSCULAR; INTRAVENOUS; SUBCUTANEOUS ONCE
Refills: 0 | Status: COMPLETED | OUTPATIENT
Start: 2022-09-11 | End: 2022-09-11

## 2022-09-11 RX ORDER — ONDANSETRON 8 MG/1
4 TABLET, FILM COATED ORAL ONCE
Refills: 0 | Status: COMPLETED | OUTPATIENT
Start: 2022-09-11 | End: 2022-09-11

## 2022-09-11 RX ORDER — FAMOTIDINE 10 MG/ML
20 INJECTION INTRAVENOUS ONCE
Refills: 0 | Status: COMPLETED | OUTPATIENT
Start: 2022-09-11 | End: 2022-09-11

## 2022-09-11 RX ADMIN — Medication 975 MILLIGRAM(S): at 20:44

## 2022-09-11 RX ADMIN — SODIUM CHLORIDE 1000 MILLILITER(S): 9 INJECTION INTRAMUSCULAR; INTRAVENOUS; SUBCUTANEOUS at 20:45

## 2022-09-11 RX ADMIN — ONDANSETRON 4 MILLIGRAM(S): 8 TABLET, FILM COATED ORAL at 20:45

## 2022-09-11 RX ADMIN — FAMOTIDINE 20 MILLIGRAM(S): 10 INJECTION INTRAVENOUS at 20:45

## 2022-09-11 NOTE — ED PROVIDER NOTE - OBJECTIVE STATEMENT
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 Dela Cruz ([phone]) at [facility] (first appointment was 08/24/22), recent med admissions to [facility] for behavioral issues, on Abilify  mg q4w (received this week at [facility], unclear exact date), history of behavioral disturbance in contexts of routine changes or when family members are ill and requiring multiple days away from home (last in February 2022, and now), whose mother wanted pt psychiatrically/medically evaluated today as this morning he had been drooling, with diarrhea, and anxiety. 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 Dela Cruz ([phone]) at [facility] (first appointment was 08/24/22), recent med admissions to [facility] for behavioral issues, on Abilify  mg q4w (received this week at [facility], unclear exact date), history of behavioral disturbance in contexts of routine changes or when family members are ill and requiring multiple days away from home (last in February 2022, and now), whose mother wanted pt psychiatrically/medically evaluated today as this morning he has been generally weak, almost fell in shower. Had increased drooling, abd pain, watery diarrhea, and anxiety. Pt endorses worsening visual/auditory hallucinations, says he's seeing/hearing dead people. Allergic to latex and motrin. 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 Dela Cruz ([phone]) at [facility] (first appointment was 08/24/22), recent med admissions to [facility] for behavioral issues, supposed to be on Abilify  mg q4w (received this week at [facility]), history of mutliple behavioral disturbances p/w multiple medical and psych complaints. Obtained most hx from pt's mother over the phone. Mother says that pt was seen at [facility] ED yesterday for increased weakness, drooling, abd pain, and visual hallucinations. Pt had blood-work done in  and was discharged home. Pt returning to ED today because mother feels that pt is weaker today, has had increased episodes of watery diarrhea, and almost fell in shower today. Says that he is drooling, but pt denies any difficulty swallowing or SOB. Pt endorses worsening visual/auditory hallucinations, says he's seeing/hearing dead people. Says that he can't walk because he's too weak. Pt's mom says that she thinks that he is having adverse reaction to Abilify that he received a few days ago, so she held that medication today. Because of the increased weakness and diarrhea, mom didn't give pt his normal GI meds (miralax and Dulcolax) that he takes for his Crohn's. Pt has outpatient appointment with psychiatrist in late September. Allergic to latex and motrin. 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 Dela Cruz ([phone]) at [facility] (first appointment was 08/24/22), recent med admissions to [facility] for behavioral issues, supposed to be on Abilify  mg q4w (received this week at [facility]), history of multiple behavioral disturbances p/w multiple medical and psych complaints. Obtained most hx from pt's mother over the phone. Mother says that pt was seen at [facility] ED yesterday for increased weakness, drooling, abd pain, and visual hallucinations. Pt had blood-work done in  and was discharged home. Pt returning to ED today because mother feels that pt is weaker today, has had increased episodes of watery diarrhea, and almost fell in shower today. Says that he is drooling, but pt denies any difficulty swallowing or SOB. Pt endorses worsening visual/auditory hallucinations, says he's seeing/hearing dead people. Says that he can't walk because he's too weak. Pt's mom says that she thinks that he is having adverse reaction to Abilify that he received a few days ago, so she held that medication today. Because of the increased weakness and diarrhea, mom didn't give pt his normal GI meds (miralax and Dulcolax) that he takes for his Crohn's. Pt has outpatient appointment with psychiatrist in late September. Allergic to latex and motrin.

## 2022-09-11 NOTE — ED PROVIDER NOTE - PHYSICAL EXAMINATION
Gen: In mild distress. A&Ox3. Non-toxic appearing.  HEENT: Normocephalic and atraumatic. PERRL, EOMI, no nasal discharge, mucous membranes dry, no scleral icterus.  CV: NSR. Tachycardic to 100s. S1/S2, no M/R/G. No significant lower extremity edema. Radial and DP pulses present and symmetrical. Capillary refill less than 2 seconds.  Resp: Normal effort and rate. CTAB, no rales, rhonchi, or wheezes.  GI: Abdomen soft, non-distended, TTP diffusely. No masses appreciated.  MSK: No open wounds and no bruising. No midline spinal tenderness.  Neuro: Following commands, speaking in full sentences, moving extremities spontaneously  Psych: Appropriate mood, cooperative

## 2022-09-11 NOTE — ED PROVIDER NOTE - ATTENDING CONTRIBUTION TO CARE
Of colitis on mercaptopurine, autism spectrum disorder, bipolar disease presents to the ED with diffuse abdominal pain and diarrhea, nonbloody.  Mother also notes that he has been more lethargic, almost fell today, she believes this is related to Abilify that was given to him at [facility] a few days ago where he was for a psych evaluation.  There is also seen in this ED for same symptoms yesterday, lab work was unrevealing, per psych note patient did not meet any criteria for voluntary or involuntary admission at that time.  Mother states that he continues to hallucinate and see dead people, she is concerned and thinks he needs admission.  Abdomen is diffusely tender but no rebound or guarding.  Plan for labs, CT abdomen pelvis, stool studies, reassess.

## 2022-09-11 NOTE — ED ADULT NURSE NOTE - OBJECTIVE STATEMENT
Facilitator RN: pt received to room 8. pt A&Ox4, ambulatory, hx of autism and Angelman's syndrome, c/o abdominal pain, n/v/d since yesterday, unable to keep anything PO. Pt also endorses having auditory hallucination. "they are saying mean stuffs" "it is the side effect of my medication". denies SI and HI. Abdomen soft, non-distended. States pain is all over. Last BM today. Denies hemauria and dysuria.  20G IV placed in right forearm, lab drawn and sent. MD fegn in progress. Side rails up, bed at lowest position, call bell within reach, patient oriented to the unit, safety maintained. Belonging secured with family member, PCA at bedside for 1:1. Report endorsed to primary RN.

## 2022-09-11 NOTE — ED PROVIDER NOTE - PATIENT PORTAL LINK FT
You can access the FollowMyHealth Patient Portal offered by [facility] by registering at the following website: [email or web address]. By joining [employer]’s FollowMyHealth portal, you will also be able to view your health information using other applications (apps) compatible with our system.

## 2022-09-11 NOTE — ED ADULT NURSE NOTE - CHIEF COMPLAINT QUOTE
family brings pt in for eval of weakness and hallucinations pt has autisms / pt seen here last for psychiatric reasons and discharge/ pt c/o  returns c/o hallucinations abd pain diarrhea dizziness lite headiness mom has held pts GI meds since being on ambilify mom states pt was z[facility] for aggressive behavior   MOM  phone Number  Beverly

## 2022-09-11 NOTE — ED ADULT TRIAGE NOTE - CHIEF COMPLAINT QUOTE
family brings pt in for eval of weakness and hallucinations pt has autisms / pt seen here last for psychiatric reasons and discharge/ pt c/o  returns c/o hallucinations abd pain diarrhea dizziness lite headiness mom has held pts GI meds since being on ambilify mom states pt was zt [facility] for aggressive behavior   MOM  phone Number  Beveryl

## 2022-09-11 NOTE — ED PROVIDER NOTE - PROGRESS NOTE DETAILS
Attending MD Carlson.  Pt signed out to me in stable condition pending CTAP, TBA for lethargy, loose stool, 21 yo male with autism, bipolar, UC on mercaptopurine, here with diarrhea, fatigue, inc hallucinations, seen yesterday for same, cleared by psych. Attending MD Carlson.  Pt's medical work-up is without acutely actionable findings on labs/CT.  Prolonged discussion with mom re: pt's care.  He is currently exceeding her ability to care for him 2/2 recurrent diarrhea, fatigue and behavioral changes associated with this.  There is not strong concern for unsafe medical process however pt warrants admission for case management to address his care at home, psych to medically optimize for behavioral improvement and discussion re: pt's care goals/resources to reduce recurrent bounceback for same complaint.  Pt was seen and cleared by psych yesterday from SI/HI perspective but warrants inpt psych consult for med optimization as this may contribute to pt's functional status being reduced from baseline.

## 2022-09-11 NOTE — ED PROVIDER NOTE - NS ED ROS FT
GENERAL: +generalized weakness; no fever or chills  EYES: No change in vision  HEENT: +drooling; no trouble swallowing or speaking  CARDIAC: No chest pain  PULMONARY: No cough or SOB  GI: +abdominal pain, nausea, vomiting, diarrhea, constipation  : No changes in urination  SKIN: No rashes  NEURO: No headache, no numbness  MSK: No joint pain  PSYCH: +visual hallucinations  Otherwise as HPI or negative.

## 2022-09-12 DIAGNOSIS — Z29.9 ENCOUNTER FOR PROPHYLACTIC MEASURES, UNSPECIFIED: ICD-10-CM

## 2022-09-12 DIAGNOSIS — K50.90 CROHN'S DISEASE, UNSPECIFIED, WITHOUT COMPLICATIONS: ICD-10-CM

## 2022-09-12 DIAGNOSIS — F84.0 AUTISTIC DISORDER: ICD-10-CM

## 2022-09-12 DIAGNOSIS — R19.7 DIARRHEA, UNSPECIFIED: ICD-10-CM

## 2022-09-12 DIAGNOSIS — R53.83 OTHER FATIGUE: ICD-10-CM

## 2022-09-12 DIAGNOSIS — F31.9 BIPOLAR DISORDER, UNSPECIFIED: ICD-10-CM

## 2022-09-12 LAB
ANION GAP SERPL CALC-SCNC: 11 MMOL/L — SIGNIFICANT CHANGE UP (ref 7–14)
APPEARANCE UR: CLEAR — SIGNIFICANT CHANGE UP
BACTERIA # UR AUTO: NEGATIVE — SIGNIFICANT CHANGE UP
BILIRUB UR-MCNC: NEGATIVE — SIGNIFICANT CHANGE UP
BUN SERPL-MCNC: 8 MG/DL — SIGNIFICANT CHANGE UP (ref 7–23)
CALCIUM SERPL-MCNC: 8.6 MG/DL — SIGNIFICANT CHANGE UP (ref 8.4–10.5)
CHLORIDE SERPL-SCNC: 100 MMOL/L — SIGNIFICANT CHANGE UP (ref 98–107)
CO2 SERPL-SCNC: 25 MMOL/L — SIGNIFICANT CHANGE UP (ref 22–31)
COLOR SPEC: YELLOW — SIGNIFICANT CHANGE UP
CREAT SERPL-MCNC: 0.78 MG/DL — SIGNIFICANT CHANGE UP (ref 0.5–1.3)
DIFF PNL FLD: NEGATIVE — SIGNIFICANT CHANGE UP
EGFR: 130 ML/MIN/1.73M2 — SIGNIFICANT CHANGE UP
EPI CELLS # UR: 3 /HPF — SIGNIFICANT CHANGE UP (ref 0–5)
GLUCOSE SERPL-MCNC: 97 MG/DL — SIGNIFICANT CHANGE UP (ref 70–99)
GLUCOSE UR QL: NEGATIVE — SIGNIFICANT CHANGE UP
HYALINE CASTS # UR AUTO: 5 /LPF — SIGNIFICANT CHANGE UP (ref 0–7)
KETONES UR-MCNC: ABNORMAL
LEUKOCYTE ESTERASE UR-ACNC: NEGATIVE — SIGNIFICANT CHANGE UP
NITRITE UR-MCNC: NEGATIVE — SIGNIFICANT CHANGE UP
PH UR: 6.5 — SIGNIFICANT CHANGE UP (ref 5–8)
POTASSIUM SERPL-MCNC: 3.6 MMOL/L — SIGNIFICANT CHANGE UP (ref 3.5–5.3)
POTASSIUM SERPL-SCNC: 3.6 MMOL/L — SIGNIFICANT CHANGE UP (ref 3.5–5.3)
PROT UR-MCNC: ABNORMAL
RBC CASTS # UR COMP ASSIST: 2 /HPF — SIGNIFICANT CHANGE UP (ref 0–4)
SODIUM SERPL-SCNC: 136 MMOL/L — SIGNIFICANT CHANGE UP (ref 135–145)
SP GR SPEC: 1.04 — SIGNIFICANT CHANGE UP (ref 1.01–1.05)
TSH SERPL-MCNC: 0.86 UIU/ML — SIGNIFICANT CHANGE UP (ref 0.27–4.2)
UROBILINOGEN FLD QL: ABNORMAL
WBC UR QL: 2 /HPF — SIGNIFICANT CHANGE UP (ref 0–5)

## 2022-09-12 PROCEDURE — 99223 1ST HOSP IP/OBS HIGH 75: CPT

## 2022-09-12 PROCEDURE — 90792 PSYCH DIAG EVAL W/MED SRVCS: CPT

## 2022-09-12 PROCEDURE — 74177 CT ABD & PELVIS W/CONTRAST: CPT | Mod: 26,QQ

## 2022-09-12 RX ORDER — POLYETHYLENE GLYCOL 3350 17 G/17G
17 POWDER, FOR SOLUTION ORAL
Qty: 0 | Refills: 0 | DISCHARGE

## 2022-09-12 RX ORDER — ACETAMINOPHEN 500 MG
650 TABLET ORAL EVERY 6 HOURS
Refills: 0 | Status: DISCONTINUED | OUTPATIENT
Start: 2022-09-12 | End: 2022-09-20

## 2022-09-12 RX ORDER — LANOLIN ALCOHOL/MO/W.PET/CERES
3 CREAM (GRAM) TOPICAL AT BEDTIME
Refills: 0 | Status: DISCONTINUED | OUTPATIENT
Start: 2022-09-12 | End: 2022-09-20

## 2022-09-12 RX ORDER — ARIPIPRAZOLE 15 MG/1
400 TABLET ORAL
Qty: 0 | Refills: 0 | DISCHARGE

## 2022-09-12 RX ORDER — INFLUENZA VIRUS VACCINE 15; 15; 15; 15 UG/.5ML; UG/.5ML; UG/.5ML; UG/.5ML
0.5 SUSPENSION INTRAMUSCULAR ONCE
Refills: 0 | Status: DISCONTINUED | OUTPATIENT
Start: 2022-09-12 | End: 2022-09-20

## 2022-09-12 RX ORDER — CALCIUM CARBONATE 500(1250)
1 TABLET ORAL
Qty: 0 | Refills: 0 | DISCHARGE

## 2022-09-12 RX ORDER — CALCIUM CARBONATE 500(1250)
1 TABLET ORAL
Refills: 0 | Status: DISCONTINUED | OUTPATIENT
Start: 2022-09-12 | End: 2022-09-20

## 2022-09-12 RX ADMIN — Medication 1 TABLET(S): at 18:35

## 2022-09-12 NOTE — H&P ADULT - NSHPREVIEWOFSYSTEMS_GEN_ALL_CORE
CONSTITUTIONAL: No fever; No chills; No night sweats; No weight loss; No fatigue  EYES: No eye pain; No blurry vision  ENMT:  No difficulty hearing; No sinus or throat pain  NECK: No pain or stiffness  RESPIRATORY: No cough; No wheezing; No hemoptysis; No shortness of breath; No sputum production  CARDIOVASCULAR: No chest pain; No palpitations; No leg swelling  GASTROINTESTINAL: No abdominal pain; No nausea; No vomiting; No hematemesis; No diarrhea; No constipation. No melena or hematochezia.  GENITOURINARY: No dysuria; No frequency; No hematuria; No incontinence  NEUROLOGICAL: No headaches; No loss of strength; No numbness  SKIN: No itching/burning; No rashes or lesions   MUSCULOSKELETAL: No joint pain or swelling; No muscle, back, or extremity pain  HEME/LYMPH: No easy bruising, or bleeding gums   Psych: See above

## 2022-09-12 NOTE — H&P ADULT - PROBLEM SELECTOR PLAN 3
- Plan for diarrhea as above  - Continue mercaptopurine at home dose  - for now hold laxatives until diarrhea confirmed to be resolved.

## 2022-09-12 NOTE — PATIENT PROFILE ADULT - STATED REASON FOR ADMISSION
"I don't feel good. I had diarrhea."   per patients mother, "he had a behavioral outburst at home and had to call 911. At [facility] they gave him IV Abilify, and he had his oral dose too. I think this is a result of too much."

## 2022-09-12 NOTE — H&P ADULT - PROBLEM SELECTOR PLAN 2
- Reports watery diarrhea once per day, last episode per patient on 9/9 but per mother has been increasing and causing weakness.  - While in the ED there were no observed episodes of diarrhea, WBC normal and afebrile  - CT A/P performed without acute pathology and exam benign - doubt Crohn's flair.   - GI PCR, C.Diff and stool culture/O+P ordered  - Diarrhea is in the setting of bisacodyl and Miralax use for chronic constipation, suspect overflow vs laxative induced diarrhea.
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
## 2022-09-12 NOTE — H&P ADULT - NSHPPHYSICALEXAM_GEN_ALL_CORE
PHYSICAL EXAM:  GENERAL: NAD, well-developed  HEAD:  Atraumatic, Normocephalic  EYES: EOMI, PERRLA, conjunctiva and sclera clear  NECK: Supple, No JVD  CHEST/LUNG: Clear to auscultation bilaterally; No wheeze/rhonchi/rale  HEART: Regular rate and rhythm; No murmurs, rubs, or gallops  ABDOMEN: Soft, Nontender, Nondistended; Bowel sounds present  EXTREMITIES:  2+ Peripheral Pulses, No clubbing, cyanosis, or edema  PSYCH: AAOx3, echolalia  NEUROLOGY: non-focal  SKIN: No rashes or lesions

## 2022-09-12 NOTE — BH CONSULTATION LIAISON ASSESSMENT NOTE - NSBHCHARTREVIEWLAB_PSY_A_CORE FT
15.3   8.35  )-----------( 283      ( 11 Sep 2022 20:20 )             45.1   09-12    136  |  100  |  8   ----------------------------<  97  3.6   |  25  |  0.78    Ca    8.6      12 Sep 2022 02:17  Mg     2.00     09-11    TPro  7.2  /  Alb  4.1  /  TBili  0.4  /  DBili  x   /  AST  69<H>  /  ALT  27  /  AlkPhos  44  09-11

## 2022-09-12 NOTE — H&P ADULT - ASSESSMENT
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

## 2022-09-12 NOTE — H&P ADULT - NS ATTEND AMEND GEN_ALL_CORE FT
23 y/o M with PMH Crohn's disease, ASD and bipolar disorder who presents for auditory and visual hallucinations as well as diarrhea.    #Bipolar disorder, pt with increasing auditory and visual hallucinations. On abilify injections. Family thinks worsening symptoms may be related to abilify. Psych to be consulted. Currently calm and not agitated. A&Ox3.     #Diarrhea, pt with watery diarrhea. 1 episode per day. No blood in the stool. Pt. on laxatives at home. CTAP without any significant findings. Likely 2/2 laxative use and less likely infectious. Will hold laxatives for now and if diarrhea does not improve then will send infectious work up. Less likely crohns flare. Will monitor.     #Dispo: will need SW consult and consider placement given difficulty caring for him at home.

## 2022-09-12 NOTE — H&P ADULT - PROBLEM SELECTOR PLAN 4
- Psych consult as above  - Family has been in progress of getting group home placement which was hoped to be completed 10/2022.   - SW/CM input for safe discharge recommendations.

## 2022-09-12 NOTE — H&P ADULT - NSHPSOCIALHISTORY_GEN_ALL_CORE
Lives with his mother and family, currently not working  denies smoking, etoh, or recreational drug use

## 2022-09-12 NOTE — ED ADULT NURSE REASSESSMENT NOTE - NS ED NURSE REASSESS COMMENT FT1
assumed care of patient, patient resting comfortably on stretcher. a&o x3. NAD noted. will continue to monitor.

## 2022-09-12 NOTE — H&P ADULT - NSHPLABSRESULTS_GEN_ALL_CORE
15.3   8.35  )-----------( 283      ( 11 Sep 2022 20:20 )             45.1     09-12    136  |  100  |  8   ----------------------------<  97  3.6   |  25  |  0.78    Ca    8.6      12 Sep 2022 02:17  Mg     2.00     09-11    TPro  7.2  /  Alb  4.1  /  TBili  0.4  /  DBili  x   /  AST  69<H>  /  ALT  27  /  AlkPhos  44  09-11    Urinalysis (09.12.22 @ 00:21)    pH Urine: 6.5    Glucose Qualitative, Urine: Negative    Blood, Urine: Negative    Color: Yellow    Urine Appearance: Clear    Bilirubin: Negative    Ketone - Urine: Trace    Specific Gravity: 1.037    Protein, Urine: 30 mg/dL    Urobilinogen: 3 mg/dL    Nitrite: Negative    Leukocyte Esterase Concentration: Negative    Respiratory Viral Panel with COVID-19 by EROS (09.11.22 @ 21:25)    Rapid RVP Result: NotDetec    SARS-CoV-2: NotDetec: This Respiratory Panel uses polymerase chain reaction (PCR) to detect for  adenovirus; coronavirus (HKU1, NL63, 229E, OC43); human metapneumovirus  (hMPV); human enterovirus/rhinovirus (Entero/RV); influenza A; influenza  A/H1; influenza A/H3; influenza A/H1-2009; influenza B; parainfluenza  viruses 1, 2, 3, 4; respiratory syncytial virus; Mycoplasma pneumoniae;  Chlamydophila pneumoniae; and SARS-CoV-2.    Adenovirus (RapRVP): NotDetec    Influenza A (RapRVP): NotDetec    Influenza B (RapRVP): NotDetec    Parainfluenza 1 (RapRVP): NotDetec    Parainfluenza 2 (RapRVP): NotDetec    Parainfluenza 3 (RapRVP): NotDetec    Parainfluenza 4 (RapRVP): NotDetec    Resp Syncytial Virus (RapRVP): NotDetec    Bordetella pertussis (RapRVP): NotDetec    Bordetella parapertussis (RapRVP): NotDetec    Chlamydia pneumoniae (RapRVP): NotDetec    Mycoplasma pneumoniae (RapRVP): NotDetec    Entero/Rhinovirus (RapRVP): NotDetec    HKU1 Coronavirus (RapRVP): NotDetec    NL63 Coronavirus (RapRVP): NotDetec    229E Coronavirus (RapRVP): NotDetec    OC43 Coronavirus (RapRVP): NotDetec    hMPV (RapRVP): NotDetec    CXR - Clear  EKG NSR 93 QTc 447  CT A/P - No other acute findings.
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
## 2022-09-12 NOTE — BH CONSULTATION LIAISON ASSESSMENT NOTE - HPI (INCLUDE ILLNESS QUALITY, SEVERITY, DURATION, TIMING, CONTEXT, MODIFYING FACTORS, ASSOCIATED SIGNS AND SYMPTOMS)
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 Dela Cruz ([phone]) at [facility] (first appt was 8/24/22),  recent med admissions to [facility] for behavioral issues, on Abilify  mg q4w (received this week at [facility], unclear exact date), history of behavioral disturbance in contexts of routine changes or when family members are ill and requiring multiple days away from home (last in February 2022, and now), who was seen in the psych ER two days ago for concern of diarrhea, drooling and he was ultimately discharged back home.  Patient returns to ER for worsening diarrhea and was admitted.    Psych CL consulted as patient continues to endorse AH/VH.    Patient seen in the ER. He is calm but anxious. He perseverates on having AH/VH and diarrhea due to his abilify. He is not happy that he is on abilify. He denies SI/HI. States that he is not worried about his grandmother right now. Conversation always returns to abilify. He states that he hears voices that sound somewhat command in nature though not to hurt himself or others. He also states that he sees dead people.    I spoke with mother this late afternoon. She states that ever since the abilify she has noticed him drooling, having diarrhea, and complaining of AH and VH. She does not want him to be on abilify at this time nor does she want other antipsychotics. She agrees with observing for now. She is worried about his behavior of late and wonders if inpatient psychiatry is warranted though notes that she prefers to not have him on certain medication given that he already got the injection of abilify. She understands that prn ativan may be used if severely agitated.

## 2022-09-12 NOTE — H&P ADULT - PROBLEM SELECTOR PLAN 1
- Admit to medicine  - Reported increase in visual and audiotry hallucination and behavioral disturbances relating to his grandmother being in hospital  - Evaluated by  on 9/10; low risk and advised outpatient follow up  - Now returning for "diarrhea" and concern his family can not care for him at home.  - Psych consult called for further evaluation and consideration of medication adjustment as patient and family feel may be related to Abilify injection. - Admit to medicine  - Reported increase in visual and auditory hallucination and behavioral disturbances relating to his grandmother being in hospital  - Evaluated by  on 9/10; low risk and advised outpatient follow up  - Now returning for "diarrhea" and concern his family can not care for him at home.  - Psych consult called for further evaluation and consideration of medication adjustment as patient and family feel may be related to Abilify injection.

## 2022-09-12 NOTE — BH CONSULTATION LIAISON ASSESSMENT NOTE - MSE UNSTRUCTURED FT
Mental Status Exam:  Manan: well groomed, fair hygiene, drooling  Behavior: anxious, restless  Motor: no tremors, EPS, or rigidity  Gait: did not assess, pt in bed  Speech: normal rate, rhythm, prosody and volume   Mood: "not good"  Affect: anxious  Thought process: perseverative  Thought Content: denies paranoia, delusions, worried about medication  Perception: +AH/VH though does not appear to be overtly internally preoccupied  SI: denies  HI: denies  Insight: limited  Judgment: fair    Cognitive Exam:  Orientation: AO  Attention: impaired

## 2022-09-12 NOTE — ED ADULT NURSE REASSESSMENT NOTE - NS ED NURSE REASSESS COMMENT FT1
Patient resting comfortably at this time. Respirations even and unlabored. Safety measures maintained.

## 2022-09-12 NOTE — PATIENT PROFILE ADULT - FALL HARM RISK - HARM RISK INTERVENTIONS

## 2022-09-12 NOTE — BH CONSULTATION LIAISON ASSESSMENT NOTE - NSBHCHARTREVIEWVS_PSY_A_CORE FT
Vital Signs Last 24 Hrs  T(C): 36.7 (12 Sep 2022 13:28), Max: 36.8 (12 Sep 2022 01:23)  T(F): 98 (12 Sep 2022 13:28), Max: 98.2 (12 Sep 2022 01:23)  HR: 95 (12 Sep 2022 13:28) (90 - 111)  BP: 143/90 (12 Sep 2022 13:28) (101/81 - 143/90)  BP(mean): 98 (12 Sep 2022 01:23) (98 - 98)  RR: 15 (12 Sep 2022 13:28) (15 - 18)  SpO2: 100% (12 Sep 2022 13:28) (99% - 100%)    Parameters below as of 12 Sep 2022 13:28  Patient On (Oxygen Delivery Method): room air

## 2022-09-12 NOTE — BH CONSULTATION LIAISON ASSESSMENT NOTE - RISK ASSESSMENT
Chronic: ASD dx, chronic medical issues, male gender, low frustration tolerance  Acute: agitation in context of routine change, recent medical hospitalizations  Protective: supportive family, stable domicile, outpatient MH/med care

## 2022-09-12 NOTE — BH CONSULTATION LIAISON ASSESSMENT NOTE - DESCRIPTION
Lives with mother, grandparents, 2 siblings, no substance use, in outpatient care, HS graduate (special ed), in process of group home placement 10/12/22

## 2022-09-12 NOTE — BH CONSULTATION LIAISON ASSESSMENT NOTE - SUMMARY
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 Dela Cruz ([phone]) at [facility] (first appt was 8/24/22),  recent med admissions to [facility] for behavioral issues, on Abilify  mg q4w (received this week at [facility], unclear exact date), history of behavioral disturbance in contexts of routine changes or when family members are ill and requiring multiple days away from home (last in February 2022, and now), who was seen in the psych ER two days ago for concern of diarrhea, drooling and he was ultimately discharged back home.  Patient returns to ER for worsening diarrhea and was admitted.    Psych CL consulted as patient continues to endorse AH/VH.    Patient is complaining of AH/VH. Mom is quite concerned about side effects from abilify and recent AH/VH and new diarrhea and weakness. She is not sure if he should be admitted to inpatient psych.    At this point in time- 1:1 for now unless at 7S and then can have enhanced supervision  No antipsychotics  Can use ativan 0.5mg q6h prn for agitation  Dispo has yet to be determined

## 2022-09-13 LAB
A1C WITH ESTIMATED AVERAGE GLUCOSE RESULT: 5.2 % — SIGNIFICANT CHANGE UP (ref 4–5.6)
ANION GAP SERPL CALC-SCNC: 13 MMOL/L — SIGNIFICANT CHANGE UP (ref 7–14)
BASOPHILS # BLD AUTO: 0.02 K/UL — SIGNIFICANT CHANGE UP (ref 0–0.2)
BASOPHILS NFR BLD AUTO: 0.3 % — SIGNIFICANT CHANGE UP (ref 0–2)
BUN SERPL-MCNC: 7 MG/DL — SIGNIFICANT CHANGE UP (ref 7–23)
CALCIUM SERPL-MCNC: 9.4 MG/DL — SIGNIFICANT CHANGE UP (ref 8.4–10.5)
CHLORIDE SERPL-SCNC: 101 MMOL/L — SIGNIFICANT CHANGE UP (ref 98–107)
CO2 SERPL-SCNC: 22 MMOL/L — SIGNIFICANT CHANGE UP (ref 22–31)
CREAT SERPL-MCNC: 0.74 MG/DL — SIGNIFICANT CHANGE UP (ref 0.5–1.3)
CULTURE RESULTS: SIGNIFICANT CHANGE UP
EGFR: 132 ML/MIN/1.73M2 — SIGNIFICANT CHANGE UP
EOSINOPHIL # BLD AUTO: 0.08 K/UL — SIGNIFICANT CHANGE UP (ref 0–0.5)
EOSINOPHIL NFR BLD AUTO: 1.2 % — SIGNIFICANT CHANGE UP (ref 0–6)
ESTIMATED AVERAGE GLUCOSE: 103 — SIGNIFICANT CHANGE UP
GLUCOSE SERPL-MCNC: 80 MG/DL — SIGNIFICANT CHANGE UP (ref 70–99)
HCT VFR BLD CALC: 46.2 % — SIGNIFICANT CHANGE UP (ref 39–50)
HGB BLD-MCNC: 15.6 G/DL — SIGNIFICANT CHANGE UP (ref 13–17)
IANC: 3.48 K/UL — SIGNIFICANT CHANGE UP (ref 1.8–7.4)
IMM GRANULOCYTES NFR BLD AUTO: 0.3 % — SIGNIFICANT CHANGE UP (ref 0–1.5)
LYMPHOCYTES # BLD AUTO: 2.17 K/UL — SIGNIFICANT CHANGE UP (ref 1–3.3)
LYMPHOCYTES # BLD AUTO: 33 % — SIGNIFICANT CHANGE UP (ref 13–44)
MAGNESIUM SERPL-MCNC: 2.2 MG/DL — SIGNIFICANT CHANGE UP (ref 1.6–2.6)
MCHC RBC-ENTMCNC: 27.9 PG — SIGNIFICANT CHANGE UP (ref 27–34)
MCHC RBC-ENTMCNC: 33.8 GM/DL — SIGNIFICANT CHANGE UP (ref 32–36)
MCV RBC AUTO: 82.5 FL — SIGNIFICANT CHANGE UP (ref 80–100)
MONOCYTES # BLD AUTO: 0.81 K/UL — SIGNIFICANT CHANGE UP (ref 0–0.9)
MONOCYTES NFR BLD AUTO: 12.3 % — SIGNIFICANT CHANGE UP (ref 2–14)
NEUTROPHILS # BLD AUTO: 3.48 K/UL — SIGNIFICANT CHANGE UP (ref 1.8–7.4)
NEUTROPHILS NFR BLD AUTO: 52.9 % — SIGNIFICANT CHANGE UP (ref 43–77)
NRBC # BLD: 0 /100 WBCS — SIGNIFICANT CHANGE UP (ref 0–0)
NRBC # FLD: 0 K/UL — SIGNIFICANT CHANGE UP (ref 0–0)
PHOSPHATE SERPL-MCNC: 4.1 MG/DL — SIGNIFICANT CHANGE UP (ref 2.5–4.5)
PLATELET # BLD AUTO: 258 K/UL — SIGNIFICANT CHANGE UP (ref 150–400)
POTASSIUM SERPL-MCNC: 4.5 MMOL/L — SIGNIFICANT CHANGE UP (ref 3.5–5.3)
POTASSIUM SERPL-SCNC: 4.5 MMOL/L — SIGNIFICANT CHANGE UP (ref 3.5–5.3)
RBC # BLD: 5.6 M/UL — SIGNIFICANT CHANGE UP (ref 4.2–5.8)
RBC # FLD: 13.8 % — SIGNIFICANT CHANGE UP (ref 10.3–14.5)
SODIUM SERPL-SCNC: 136 MMOL/L — SIGNIFICANT CHANGE UP (ref 135–145)
SPECIMEN SOURCE: SIGNIFICANT CHANGE UP
WBC # BLD: 6.58 K/UL — SIGNIFICANT CHANGE UP (ref 3.8–10.5)
WBC # FLD AUTO: 6.58 K/UL — SIGNIFICANT CHANGE UP (ref 3.8–10.5)

## 2022-09-13 PROCEDURE — 99233 SBSQ HOSP IP/OBS HIGH 50: CPT

## 2022-09-13 RX ADMIN — Medication 1 TABLET(S): at 18:04

## 2022-09-13 RX ADMIN — Medication 30 MILLILITER(S): at 14:30

## 2022-09-13 RX ADMIN — Medication 1 TABLET(S): at 05:56

## 2022-09-13 NOTE — PROGRESS NOTE ADULT - NSPROGADDITIONALINFOA_GEN_ALL_CORE
Dispo: pending psych recs Dispo: pending psych recs    Updated mother, Beverly, on 9/13 about plan and she does not feel comfortable taking him home while he is still having auditory hallucinations.

## 2022-09-13 NOTE — PROGRESS NOTE ADULT - PROBLEM SELECTOR PLAN 2
- Diarrhea is in the setting of bisacodyl and Miralax use for chronic constipation, suspect overflow vs laxative induced diarrhea.  - Reports watery diarrhea once per day, last episode per patient on 9/9 but per mother has been increasing and causing weakness.  - While in the ED there were no observed episodes of diarrhea, WBC normal and afebrile  - CT A/P performed without acute pathology and exam benign - doubt Crohn's flair.   - GI PCR, C.Diff and stool culture/O+P ordered- doubt infectious

## 2022-09-13 NOTE — PROGRESS NOTE ADULT - PROBLEM SELECTOR PLAN 1
-- Reported increase in visual and auditory hallucination and behavioral disturbances relating to his grandmother being in hospital and about Abilify   - Evaluated by  on 9/10; low risk and advised outpatient follow up  - Now returning for "diarrhea" and concern his family can not care for him at home.  - Psych consult called for further evaluation and recs appreciated

## 2022-09-13 NOTE — PROGRESS NOTE ADULT - SUBJECTIVE AND OBJECTIVE BOX
Patient is a 22y old  Male who presents with a chief complaint of Hallucination (12 Sep 2022 07:59)      SUBJECTIVE / OVERNIGHT EVENTS:    No events overnight. This AM, patient without n/v/d/cp/sob.  He continue to report that he is seeing dead people and hearing voices about abilify. Denies that voices are telling him to harm self or others.   States he has diarrhea and abdominal pain    MEDICATIONS  (STANDING):  calcium carbonate   1250 mG (OsCal) 1 Tablet(s) Oral two times a day  influenza   Vaccine 0.5 milliLiter(s) IntraMuscular once    MEDICATIONS  (PRN):  acetaminophen     Tablet .. 650 milliGRAM(s) Oral every 6 hours PRN Temp greater or equal to 38C (100.4F), Mild Pain (1 - 3)  aluminum hydroxide/magnesium hydroxide/simethicone Suspension 30 milliLiter(s) Oral every 6 hours PRN Dyspepsia  LORazepam     Tablet 0.5 milliGRAM(s) Oral every 6 hours PRN Agitation  melatonin 3 milliGRAM(s) Oral at bedtime PRN Insomnia      PHYSICAL EXAM:  T(C): 36.8 (22 @ 14:45), Max: 37.1 (22 @ 00:26)  HR: 87 (22 @ 14:45) (87 - 91)  BP: 126/86 (22 @ 14:45) (119/86 - 142/96)  RR: 17 (22 @ 14:45) (16 - 18)  SpO2: 100% (22 @ 14:45) (96% - 100%)  I&O's Summary    12 Sep 2022 07:01  -  13 Sep 2022 07:00  --------------------------------------------------------  IN: 200 mL / OUT: 250 mL / NET: -50 mL      GENERAL: NAD, well-developed  HEAD:  Atraumatic, Normocephalic, MMM  CHEST/LUNG: No use of accessory muscles, CTAB, breathing non-labored  COR: RR, no mrcg  ABD: Soft, ND/NT, +BS  PSYCH: AAOx3  NEUROLOGY: CN II-XII grossly intact, no focal deficits grossly noted, moving all extremities  SKIN: No rashes or lesions  EXT: wwp, no cce    LABS:  CAPILLARY BLOOD GLUCOSE                              15.6   6.58  )-----------( 258      ( 13 Sep 2022 07:02 )             46.2     09-13    136  |  101  |  7   ----------------------------<  80  4.5   |  22  |  0.74    Ca    9.4      13 Sep 2022 07:02  Phos  4.1     09-13  Mg     2.20     -13    TPro  7.2  /  Alb  4.1  /  TBili  0.4  /  DBili  x   /  AST  69<H>  /  ALT  27  /  AlkPhos  44  09-11          Urinalysis Basic - ( 12 Sep 2022 00:21 )    Color: Yellow / Appearance: Clear / S.037 / pH: x  Gluc: x / Ketone: Trace  / Bili: Negative / Urobili: 3 mg/dL   Blood: x / Protein: 30 mg/dL / Nitrite: Negative   Leuk Esterase: Negative / RBC: 2 /HPF / WBC 2 /HPF   Sq Epi: x / Non Sq Epi: 3 /HPF / Bacteria: Negative        Culture - Urine (collected 12 Sep 2022 00:21)  Source: Clean Catch Clean Catch (Midstream)  Final Report (13 Sep 2022 08:09):    <10,000 CFU/mL Normal Urogenital Radha        RADIOLOGY & ADDITIONAL TESTS:    Consultant(s) Notes Reviewed -   Psych    Care Discussed with Consultants/Other Providers -

## 2022-09-14 LAB
ANION GAP SERPL CALC-SCNC: 14 MMOL/L — SIGNIFICANT CHANGE UP (ref 7–14)
BUN SERPL-MCNC: 7 MG/DL — SIGNIFICANT CHANGE UP (ref 7–23)
CALCIUM SERPL-MCNC: 9.7 MG/DL — SIGNIFICANT CHANGE UP (ref 8.4–10.5)
CHLORIDE SERPL-SCNC: 102 MMOL/L — SIGNIFICANT CHANGE UP (ref 98–107)
CO2 SERPL-SCNC: 25 MMOL/L — SIGNIFICANT CHANGE UP (ref 22–31)
CREAT SERPL-MCNC: 0.77 MG/DL — SIGNIFICANT CHANGE UP (ref 0.5–1.3)
EGFR: 131 ML/MIN/1.73M2 — SIGNIFICANT CHANGE UP
GLUCOSE SERPL-MCNC: 88 MG/DL — SIGNIFICANT CHANGE UP (ref 70–99)
MAGNESIUM SERPL-MCNC: 2.1 MG/DL — SIGNIFICANT CHANGE UP (ref 1.6–2.6)
PHOSPHATE SERPL-MCNC: 3.9 MG/DL — SIGNIFICANT CHANGE UP (ref 2.5–4.5)
POTASSIUM SERPL-MCNC: 4 MMOL/L — SIGNIFICANT CHANGE UP (ref 3.5–5.3)
POTASSIUM SERPL-SCNC: 4 MMOL/L — SIGNIFICANT CHANGE UP (ref 3.5–5.3)
SODIUM SERPL-SCNC: 141 MMOL/L — SIGNIFICANT CHANGE UP (ref 135–145)

## 2022-09-14 PROCEDURE — 99233 SBSQ HOSP IP/OBS HIGH 50: CPT

## 2022-09-14 PROCEDURE — 99232 SBSQ HOSP IP/OBS MODERATE 35: CPT

## 2022-09-14 RX ADMIN — Medication 975 MILLIGRAM(S): at 21:24

## 2022-09-14 RX ADMIN — Medication 1 TABLET(S): at 17:12

## 2022-09-14 RX ADMIN — Medication 1 TABLET(S): at 05:06

## 2022-09-14 NOTE — PROGRESS NOTE ADULT - PROBLEM SELECTOR PLAN 2
Diarrhea is in the setting of bisacodyl and Miralax use for chronic constipation, suspect overflow vs laxative induced diarrhea  - Reports watery diarrhea once per day, last episode per patient on 9/9 but per mother has been increasing and causing weakness.  - While in the ED there were no observed episodes of diarrhea, WBC normal and afebrile  - CT A/P performed without acute pathology and exam benign - doubt Crohn's flair    - GI PCR, C.Diff and stool culture/ O+P pending

## 2022-09-14 NOTE — PROGRESS NOTE ADULT - NSPROGADDITIONALINFOA_GEN_ALL_CORE
Mom at bedside on 9/14 and concerned that pt is weak and almost fell while in the bathroom. She is concerned if this is due to Abilify side effects.   States she does not think pt is ready and concerned he cannot go to group home if he is weak.   Recently graduated from high school where he was receiving OT and speech therapy.  Requesting if pt can be seen by OT and speech. will place those consult     Emotional support provided

## 2022-09-14 NOTE — PROGRESS NOTE ADULT - PROBLEM SELECTOR PLAN 4
Psych consult as above  - Family has been in progress of getting group home placement which was hoped to be completed 10/2022.   - SW/CM input for safe discharge recommendations.  - PT/OT ordered Psych consult as above  - Family has been in progress of getting group home placement which was hoped to be completed 10/2022.   - SW/CM input for safe discharge recommendations.  - PT/OT/speech therapy ordered

## 2022-09-14 NOTE — PROGRESS NOTE ADULT - PROBLEM SELECTOR PLAN 1
- Reported increase in visual and auditory hallucination and behavioral disturbances relating to his grandmother being in hospital and about Abilify   - Evaluated by  on 9/10; low risk and advised outpatient follow up  - Now returning for "diarrhea" and concern his family can not care for him at home.  - Psych consult called for further evaluation and recs appreciated- do not think pt needs inpt psych and go home with outpt follow up

## 2022-09-14 NOTE — PROGRESS NOTE ADULT - SUBJECTIVE AND OBJECTIVE BOX
Patient is a 22y old  Male who presents with a chief complaint of Hallucination (13 Sep 2022 17:26)      SUBJECTIVE / OVERNIGHT EVENTS:    No events overnight. This AM, patient without n/v/d/cp/sob.      MEDICATIONS  (STANDING):  calcium carbonate   1250 mG (OsCal) 1 Tablet(s) Oral two times a day  influenza   Vaccine 0.5 milliLiter(s) IntraMuscular once    MEDICATIONS  (PRN):  acetaminophen     Tablet .. 650 milliGRAM(s) Oral every 6 hours PRN Temp greater or equal to 38C (100.4F), Mild Pain (1 - 3)  aluminum hydroxide/magnesium hydroxide/simethicone Suspension 30 milliLiter(s) Oral every 6 hours PRN Dyspepsia  LORazepam     Tablet 0.5 milliGRAM(s) Oral every 6 hours PRN Agitation  melatonin 3 milliGRAM(s) Oral at bedtime PRN Insomnia      PHYSICAL EXAM:  T(C): 36.8 (09-14-22 @ 11:13), Max: 36.8 (09-14-22 @ 11:13)  HR: 88 (09-14-22 @ 11:13) (78 - 88)  BP: 119/85 (09-14-22 @ 11:13) (119/85 - 136/89)  RR: 18 (09-14-22 @ 11:13) (18 - 18)  SpO2: 99% (09-14-22 @ 11:13) (97% - 100%)  I&O's Summary    GENERAL: NAD, well-developed  HEAD:  Atraumatic, Normocephalic, MMM  CHEST/LUNG: No use of accessory muscles, CTAB, breathing non-labored  COR: RR, no mrcg  ABD: Soft, ND/NT, +BS  PSYCH: AAOx3  NEUROLOGY: CN II-XII grossly intact, moving all extremities  SKIN: No rashes or lesions  EXT: wwp, no cce    LABS:  CAPILLARY BLOOD GLUCOSE                              15.6   6.58  )-----------( 258      ( 13 Sep 2022 07:02 )             46.2     09-14    141  |  102  |  7   ----------------------------<  88  4.0   |  25  |  0.77    Ca    9.7      14 Sep 2022 07:07  Phos  3.9     09-14  Mg     2.10     09-14                Culture - Urine (collected 12 Sep 2022 00:21)  Source: Clean Catch Clean Catch (Midstream)  Final Report (13 Sep 2022 08:09):    <10,000 CFU/mL Normal Urogenital Radha        RADIOLOGY & ADDITIONAL TESTS:    Consultant(s) Notes Reviewed -       Care Discussed with Consultants/Other Providers -  Patient is a 22y old  Male who presents with a chief complaint of Hallucination (13 Sep 2022 17:26)      SUBJECTIVE / OVERNIGHT EVENTS:    No events overnight. This AM, patient reports he is still seeing dead people. He complains of intermittent abdominal pain and wants to go home. States he misses his mom and feels sad.       MEDICATIONS  (STANDING):  calcium carbonate   1250 mG (OsCal) 1 Tablet(s) Oral two times a day  influenza   Vaccine 0.5 milliLiter(s) IntraMuscular once    MEDICATIONS  (PRN):  acetaminophen     Tablet .. 650 milliGRAM(s) Oral every 6 hours PRN Temp greater or equal to 38C (100.4F), Mild Pain (1 - 3)  aluminum hydroxide/magnesium hydroxide/simethicone Suspension 30 milliLiter(s) Oral every 6 hours PRN Dyspepsia  LORazepam     Tablet 0.5 milliGRAM(s) Oral every 6 hours PRN Agitation  melatonin 3 milliGRAM(s) Oral at bedtime PRN Insomnia      PHYSICAL EXAM:  T(C): 36.8 (09-14-22 @ 11:13), Max: 36.8 (09-14-22 @ 11:13)  HR: 88 (09-14-22 @ 11:13) (78 - 88)  BP: 119/85 (09-14-22 @ 11:13) (119/85 - 136/89)  RR: 18 (09-14-22 @ 11:13) (18 - 18)  SpO2: 99% (09-14-22 @ 11:13) (97% - 100%)  I&O's Summary    GENERAL: NAD, well-developed  HEAD:  Atraumatic, Normocephalic, MMM  CHEST/LUNG: No use of accessory muscles, CTAB, breathing non-labored  COR: RR, no mrcg  ABD: Soft, ND/NT, +BS  PSYCH: alert and oriented to self, place. still reports hallucinations/paranoia  NEUROLOGY: CN II-XII grossly intact, moving all extremities  SKIN: No rashes or lesions  EXT:no edema/cyanosis or clubbing noted    LABS:                        15.6   6.58  )-----------( 258      ( 13 Sep 2022 07:02 )             46.2     09-14    141  |  102  |  7   ----------------------------<  88  4.0   |  25  |  0.77    Ca    9.7      14 Sep 2022 07:07  Phos  3.9     09-14  Mg     2.10     09-14                Culture - Urine (collected 12 Sep 2022 00:21)  Source: Clean Catch Clean Catch (Midstream)  Final Report (13 Sep 2022 08:09):    <10,000 CFU/mL Normal Urogenital Radha        RADIOLOGY & ADDITIONAL TESTS:    Consultant(s) Notes Reviewed -       Care Discussed with Consultants/Other Providers -

## 2022-09-15 LAB
ANION GAP SERPL CALC-SCNC: 13 MMOL/L — SIGNIFICANT CHANGE UP (ref 7–14)
BUN SERPL-MCNC: 7 MG/DL — SIGNIFICANT CHANGE UP (ref 7–23)
CALCIUM SERPL-MCNC: 10 MG/DL — SIGNIFICANT CHANGE UP (ref 8.4–10.5)
CHLORIDE SERPL-SCNC: 99 MMOL/L — SIGNIFICANT CHANGE UP (ref 98–107)
CO2 SERPL-SCNC: 26 MMOL/L — SIGNIFICANT CHANGE UP (ref 22–31)
CREAT SERPL-MCNC: 0.76 MG/DL — SIGNIFICANT CHANGE UP (ref 0.5–1.3)
EGFR: 131 ML/MIN/1.73M2 — SIGNIFICANT CHANGE UP
GLUCOSE SERPL-MCNC: 76 MG/DL — SIGNIFICANT CHANGE UP (ref 70–99)
HCT VFR BLD CALC: 49.4 % — SIGNIFICANT CHANGE UP (ref 39–50)
HGB BLD-MCNC: 16.4 G/DL — SIGNIFICANT CHANGE UP (ref 13–17)
MAGNESIUM SERPL-MCNC: 1.9 MG/DL — SIGNIFICANT CHANGE UP (ref 1.6–2.6)
MCHC RBC-ENTMCNC: 27.4 PG — SIGNIFICANT CHANGE UP (ref 27–34)
MCHC RBC-ENTMCNC: 33.2 GM/DL — SIGNIFICANT CHANGE UP (ref 32–36)
MCV RBC AUTO: 82.6 FL — SIGNIFICANT CHANGE UP (ref 80–100)
NRBC # BLD: 0 /100 WBCS — SIGNIFICANT CHANGE UP (ref 0–0)
NRBC # FLD: 0 K/UL — SIGNIFICANT CHANGE UP (ref 0–0)
PHOSPHATE SERPL-MCNC: 4.5 MG/DL — SIGNIFICANT CHANGE UP (ref 2.5–4.5)
PLATELET # BLD AUTO: 278 K/UL — SIGNIFICANT CHANGE UP (ref 150–400)
POTASSIUM SERPL-MCNC: 3.9 MMOL/L — SIGNIFICANT CHANGE UP (ref 3.5–5.3)
POTASSIUM SERPL-SCNC: 3.9 MMOL/L — SIGNIFICANT CHANGE UP (ref 3.5–5.3)
RBC # BLD: 5.98 M/UL — HIGH (ref 4.2–5.8)
RBC # FLD: 13.5 % — SIGNIFICANT CHANGE UP (ref 10.3–14.5)
SODIUM SERPL-SCNC: 138 MMOL/L — SIGNIFICANT CHANGE UP (ref 135–145)
WBC # BLD: 7.96 K/UL — SIGNIFICANT CHANGE UP (ref 3.8–10.5)
WBC # FLD AUTO: 7.96 K/UL — SIGNIFICANT CHANGE UP (ref 3.8–10.5)

## 2022-09-15 PROCEDURE — 99233 SBSQ HOSP IP/OBS HIGH 50: CPT

## 2022-09-15 RX ORDER — POLYETHYLENE GLYCOL 3350 17 G/17G
17 POWDER, FOR SOLUTION ORAL DAILY
Refills: 0 | Status: DISCONTINUED | OUTPATIENT
Start: 2022-09-15 | End: 2022-09-20

## 2022-09-15 RX ORDER — MAGNESIUM SULFATE 500 MG/ML
1 VIAL (ML) INJECTION ONCE
Refills: 0 | Status: COMPLETED | OUTPATIENT
Start: 2022-09-15 | End: 2022-09-15

## 2022-09-15 RX ADMIN — Medication 650 MILLIGRAM(S): at 23:00

## 2022-09-15 RX ADMIN — Medication 3 MILLIGRAM(S): at 22:07

## 2022-09-15 RX ADMIN — Medication 100 GRAM(S): at 11:17

## 2022-09-15 RX ADMIN — Medication 650 MILLIGRAM(S): at 22:07

## 2022-09-15 RX ADMIN — Medication 0.5 MILLIGRAM(S): at 22:07

## 2022-09-15 RX ADMIN — Medication 1 TABLET(S): at 17:56

## 2022-09-15 RX ADMIN — POLYETHYLENE GLYCOL 3350 17 GRAM(S): 17 POWDER, FOR SOLUTION ORAL at 17:57

## 2022-09-15 NOTE — SPEECH LANGUAGE PATHOLOGY EVALUATION - COMMENTS
Hospitalist note 9/15 "21 yo male PMH of Crohn's disease, Autism spectrum disorder (related to chromosomal deletion) with PPH of Bipolar disorder, in outpatient treatment with presents with auditory and visual hallucination and reported diarrhea."    Per SLP discussion with ACP, ACP requesting Speech and Language Evaluation only given patient's diagnosis of Autism spectrum disorder. Per ACP, swallow evaluation not warranted at this time.     Patient seen at bedside, awake/alert and oriented, during Speech and Language Evaluation this PM. Patient able to follow directives and answer questions. Patient able to verbalize wants/needs. Patient was cooperative and completed all speech tasks asked of him. 1) Patient will benefit from a comprehensive Speech/Language Evaluation and Therapy pending discharge (rehab facility vs home care vs outpatient [facility] [phone]). 2) Reconsult this service as needed

## 2022-09-15 NOTE — SPEECH LANGUAGE PATHOLOGY EVALUATION - SLP DIAGNOSIS
Patient exhibited Mild Receptive Language Deficits and Mild Expressive Language Deficits marked by fluent output, reduced auditory comprehension with multi-step directives, reduced repetition skills for phrases/sentences, and adequate word finding skills. Motor Speech is marked by imprecise articulation and slow rate of speech. Volume is appropriate.

## 2022-09-15 NOTE — PROGRESS NOTE ADULT - NSPROGADDITIONALINFOA_GEN_ALL_CORE
Mom at bedside on 9/14 and concerned that pt is weak and almost fell while in the bathroom. She is concerned if this is due to Abilify side effects.   States she does not think pt is ready and concerned he cannot go to group home if he is weak.   Recently graduated from high school where he was receiving OT and speech therapy.  Requesting if pt can be seen by OT and speech. will place those consult     Emotional support provided Mom at bedside on 9/14 and concerned that pt is weak and states she does not think pt is ready and concerned he cannot go to group home if he is weak.   Recently graduated from high school where he was receiving OT and speech therapy.     Dispo planning- pending PT recs     Emotional support provided

## 2022-09-15 NOTE — OCCUPATIONAL THERAPY INITIAL EVALUATION ADULT - LEVEL OF INDEPENDENCE: CHAIR TO BED, REHAB EVAL
Not assessed. Pt left sitting in chair near bed. No acute distress. Call bell in reach. All lines intact and precautions maintained. RN, made aware. Enhanced supervision bedside.

## 2022-09-15 NOTE — OCCUPATIONAL THERAPY INITIAL EVALUATION ADULT - STANDING BALANCE: DYNAMIC, REHAB EVAL
Left message to call [phone].     Anticoagulation Management    Unable to reach Amy today.    Today's INR result of 2.6 is therapeutic (goal INR of 2.0-3.0).  Result received from: Clinic Lab    Follow up required to confirm warfarin dose taken and assess for changes    Left message to continue current dose of warfarin 10 mg tonight.      Anticoagulation clinic to follow up    Chiqui Nowak RN       fair balance

## 2022-09-15 NOTE — PHYSICAL THERAPY INITIAL EVALUATION ADULT - GENERAL OBSERVATIONS, REHAB EVAL
patient received in bed in NAD
Pt received in bed all lines intact NAD all precautions observed RN made aware, call bell left within reach, PT will continue to follow.

## 2022-09-15 NOTE — OCCUPATIONAL THERAPY INITIAL EVALUATION ADULT - MD ORDER
Occupational Therapy (OT) to evaluate and treat. Per SARAH aHmlin, pt is okay to participate in OT evaluation and perform activity as tolerated.

## 2022-09-15 NOTE — PHYSICAL THERAPY INITIAL EVALUATION ADULT - LEVEL OF INDEPENDENCE: SIT/STAND, REHAB EVAL
supervision
Great effort with therex.  x30 long arc quad at EOB.  Standing up flexion x20 b/l.  Sit to stand x20/supervision

## 2022-09-15 NOTE — OCCUPATIONAL THERAPY INITIAL EVALUATION ADULT - PERTINENT HX OF CURRENT PROBLEM, REHAB EVAL
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

## 2022-09-15 NOTE — OCCUPATIONAL THERAPY INITIAL EVALUATION ADULT - LIVES WITH, PROFILE
Pt. reports he lives with his mother, grandparents, and siblings in a house with 2 steps to enter. Once inside, pt. reports bedroom and bathroom are located on the main level. Per pt., he has a shower stall in his bathroom.

## 2022-09-15 NOTE — PHYSICAL THERAPY INITIAL EVALUATION ADULT - PERTINENT HX OF CURRENT PROBLEM, REHAB EVAL
patient is a 22 year old male admitted for diarrhea. PT consulted for "weakness"
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 Dela Cruz ([phone]) at [facility] (first appt was 8/24/22) presents with auditory and visual hallucination.

## 2022-09-15 NOTE — PHYSICAL THERAPY INITIAL EVALUATION ADULT - ADDITIONAL COMMENTS
Pt reports living at home with his family 3 steps to enter and stays on the first floor.  Pt reports he does not use DME to ambulate.  No recent hx of falls.

## 2022-09-15 NOTE — PROGRESS NOTE ADULT - SUBJECTIVE AND OBJECTIVE BOX
Patient is a 22y old  Male who presents with a chief complaint of Hallucination (14 Sep 2022 18:02)      SUBJECTIVE / OVERNIGHT EVENTS:    No events overnight. This AM, patient without n/v/d/cp/sob.      MEDICATIONS  (STANDING):  calcium carbonate   1250 mG (OsCal) 1 Tablet(s) Oral two times a day  influenza   Vaccine 0.5 milliLiter(s) IntraMuscular once    MEDICATIONS  (PRN):  acetaminophen     Tablet .. 650 milliGRAM(s) Oral every 6 hours PRN Temp greater or equal to 38C (100.4F), Mild Pain (1 - 3)  aluminum hydroxide/magnesium hydroxide/simethicone Suspension 30 milliLiter(s) Oral every 6 hours PRN Dyspepsia  LORazepam     Tablet 0.5 milliGRAM(s) Oral every 6 hours PRN Agitation  melatonin 3 milliGRAM(s) Oral at bedtime PRN Insomnia      PHYSICAL EXAM:  T(C): 36.4 (09-15-22 @ 04:50), Max: 36.8 (09-14-22 @ 11:13)  HR: 77 (09-15-22 @ 04:50) (77 - 88)  BP: 114/69 (09-15-22 @ 04:50) (114/69 - 126/77)  RR: 16 (09-15-22 @ 04:50) (16 - 18)  SpO2: 99% (09-15-22 @ 04:50) (94% - 99%)  I&O's Summary    14 Sep 2022 07:01  -  15 Sep 2022 07:00  --------------------------------------------------------  IN: 150 mL / OUT: 0 mL / NET: 150 mL      GENERAL: NAD, well-developed  HEAD:  Atraumatic, Normocephalic, MMM  CHEST/LUNG: No use of accessory muscles, CTAB, breathing non-labored  COR: RR, no mrcg  ABD: Soft, ND/NT, +BS  PSYCH: AAOx3  NEUROLOGY: CN II-XII grossly intact, moving all extremities  SKIN: No rashes or lesions  EXT: wwp, no cce    LABS:  CAPILLARY BLOOD GLUCOSE                              16.4   7.96  )-----------( 278      ( 15 Sep 2022 05:25 )             49.4     09-15    138  |  99  |  7   ----------------------------<  76  3.9   |  26  |  0.76    Ca    10.0      15 Sep 2022 05:25  Phos  4.5     09-15  Mg     1.90     09-15                  RADIOLOGY & ADDITIONAL TESTS:    Telemetry Personally Reviewed -     Imaging Personally Reviewed -     Imaging Reviewed -     Consultant(s) Notes Reviewed -       Care Discussed with Consultants/Other Providers -  Patient is a 22y old  Male who presents with a chief complaint of Hallucination (14 Sep 2022 18:02)      SUBJECTIVE / OVERNIGHT EVENTS:    No events overnight. This AM, patient without n/v/d/cp/sob. Pt states he still feels weak and wants to go home. He feels sad and still seeing dead people    MEDICATIONS  (STANDING):  calcium carbonate   1250 mG (OsCal) 1 Tablet(s) Oral two times a day  influenza   Vaccine 0.5 milliLiter(s) IntraMuscular once    MEDICATIONS  (PRN):  acetaminophen     Tablet .. 650 milliGRAM(s) Oral every 6 hours PRN Temp greater or equal to 38C (100.4F), Mild Pain (1 - 3)  aluminum hydroxide/magnesium hydroxide/simethicone Suspension 30 milliLiter(s) Oral every 6 hours PRN Dyspepsia  LORazepam     Tablet 0.5 milliGRAM(s) Oral every 6 hours PRN Agitation  melatonin 3 milliGRAM(s) Oral at bedtime PRN Insomnia      PHYSICAL EXAM:  T(C): 36.4 (09-15-22 @ 04:50), Max: 36.8 (09-14-22 @ 11:13)  HR: 77 (09-15-22 @ 04:50) (77 - 88)  BP: 114/69 (09-15-22 @ 04:50) (114/69 - 126/77)  RR: 16 (09-15-22 @ 04:50) (16 - 18)  SpO2: 99% (09-15-22 @ 04:50) (94% - 99%)  I&O's Summary    14 Sep 2022 07:01  -  15 Sep 2022 07:00  --------------------------------------------------------  IN: 150 mL / OUT: 0 mL / NET: 150 mL      GENERAL: NAD, well-developed  HEAD:  Atraumatic, Normocephalic, MMM  CHEST/LUNG: No use of accessory muscles, CTAB, breathing non-labored  COR: RR, no mrcg  ABD: Soft, ND/NT, +BS  PSYCH: AAOx3 and still having auditory hallucinations  NEUROLOGY: CN II-XII grossly intact, moving all extremities  SKIN: No rashes or lesions  EXT: wwp, no cce    LABS:  CAPILLARY BLOOD GLUCOSE                              16.4   7.96  )-----------( 278      ( 15 Sep 2022 05:25 )             49.4     09-15    138  |  99  |  7   ----------------------------<  76  3.9   |  26  |  0.76    Ca    10.0      15 Sep 2022 05:25  Phos  4.5     09-15  Mg     1.90     09-15                  RADIOLOGY & ADDITIONAL TESTS:    Consultant(s) Notes Reviewed -       Care Discussed with Consultants/Other Providers -

## 2022-09-15 NOTE — OCCUPATIONAL THERAPY INITIAL EVALUATION ADULT - DIAGNOSIS, OT EVAL
Auditory and visual hallucinations; Diarrhea; Decreased standing balance; Decreased functional mobility; Decreased ADL management Auditory and visual hallucinations; Diarrhea; Hx of ASD; Decreased standing balance; Decreased functional mobility; Decreased ADL management

## 2022-09-15 NOTE — PHYSICAL THERAPY INITIAL EVALUATION ADULT - MANUAL MUSCLE TESTING RESULTS, REHAB EVAL
Upper and lower extremities grossly assessed.  Bilaterally, pt presents with a 5/5 on MMT score./grossly assessed due to
bilateral LE 5/5

## 2022-09-15 NOTE — OCCUPATIONAL THERAPY INITIAL EVALUATION ADULT - GENERAL OBSERVATIONS, REHAB EVAL
Pt. received semisupine in bed. No acute distress. Patient agreed to evaluation from Occupational Therapist.

## 2022-09-15 NOTE — PROGRESS NOTE ADULT - PROBLEM SELECTOR PLAN 4
Psych consult as above  - Family has been in progress of getting group home placement which was hoped to be completed 10/2022.   - SW/CM input for safe discharge recommendations.  - PT/OT/speech therapy ordered

## 2022-09-15 NOTE — PHYSICAL THERAPY INITIAL EVALUATION ADULT - NSPTDISCHREC_GEN_A_CORE
Home no needs At this time pt would benefit from Inpatient rehabilitative services to improve gait and strength deficits.  Follow PT notes for functional progress

## 2022-09-15 NOTE — PHYSICAL THERAPY INITIAL EVALUATION ADULT - LEVEL OF INDEPENDENCE: STAIR NEGOTIATION, REHAB EVAL
Supervision going up, contact guard going down.  Pt slightly apprehensive when going down the stairs (most likely due to fear & cognitive impairments)./supervision

## 2022-09-15 NOTE — SPEECH LANGUAGE PATHOLOGY EVALUATION - 2-STEP
Diagnosis: Nausea, EKG abnormality.  What do you do next:   Continue your home medications unless we have specifically changed them  The [facility] cardiology service will reach out to you to help schedule an outpatient stress test on your heart.  You should certainly follow with your primary care clinic at [facility] in [place].  I do not suspect acute heart issues though sometimes nausea can be a symptom of heart related issues.  Follow up as indicated below    When do you return: If you have severe chest pain, severe shortness of breath, lightheadedness or fainting, or any other symptoms that concern you, please return to the ED for reevaluation.    Thank you for allowing us to care for you today.    
yes

## 2022-09-16 ENCOUNTER — TRANSCRIPTION ENCOUNTER (OUTPATIENT)
Age: 22
End: 2022-09-16

## 2022-09-16 LAB
ANION GAP SERPL CALC-SCNC: 12 MMOL/L — SIGNIFICANT CHANGE UP (ref 7–14)
BUN SERPL-MCNC: 8 MG/DL — SIGNIFICANT CHANGE UP (ref 7–23)
CALCIUM SERPL-MCNC: 10.1 MG/DL — SIGNIFICANT CHANGE UP (ref 8.4–10.5)
CHLORIDE SERPL-SCNC: 99 MMOL/L — SIGNIFICANT CHANGE UP (ref 98–107)
CO2 SERPL-SCNC: 26 MMOL/L — SIGNIFICANT CHANGE UP (ref 22–31)
CREAT SERPL-MCNC: 0.8 MG/DL — SIGNIFICANT CHANGE UP (ref 0.5–1.3)
EGFR: 129 ML/MIN/1.73M2 — SIGNIFICANT CHANGE UP
GLUCOSE SERPL-MCNC: 75 MG/DL — SIGNIFICANT CHANGE UP (ref 70–99)
HCT VFR BLD CALC: 48.4 % — SIGNIFICANT CHANGE UP (ref 39–50)
HGB BLD-MCNC: 16.2 G/DL — SIGNIFICANT CHANGE UP (ref 13–17)
MAGNESIUM SERPL-MCNC: 2 MG/DL — SIGNIFICANT CHANGE UP (ref 1.6–2.6)
MCHC RBC-ENTMCNC: 27.7 PG — SIGNIFICANT CHANGE UP (ref 27–34)
MCHC RBC-ENTMCNC: 33.5 GM/DL — SIGNIFICANT CHANGE UP (ref 32–36)
MCV RBC AUTO: 82.7 FL — SIGNIFICANT CHANGE UP (ref 80–100)
NRBC # BLD: 0 /100 WBCS — SIGNIFICANT CHANGE UP (ref 0–0)
NRBC # FLD: 0 K/UL — SIGNIFICANT CHANGE UP (ref 0–0)
PHOSPHATE SERPL-MCNC: 4.6 MG/DL — HIGH (ref 2.5–4.5)
PLATELET # BLD AUTO: 262 K/UL — SIGNIFICANT CHANGE UP (ref 150–400)
POTASSIUM SERPL-MCNC: 3.8 MMOL/L — SIGNIFICANT CHANGE UP (ref 3.5–5.3)
POTASSIUM SERPL-SCNC: 3.8 MMOL/L — SIGNIFICANT CHANGE UP (ref 3.5–5.3)
RBC # BLD: 5.85 M/UL — HIGH (ref 4.2–5.8)
RBC # FLD: 13.7 % — SIGNIFICANT CHANGE UP (ref 10.3–14.5)
SARS-COV-2 RNA SPEC QL NAA+PROBE: SIGNIFICANT CHANGE UP
SODIUM SERPL-SCNC: 137 MMOL/L — SIGNIFICANT CHANGE UP (ref 135–145)
WBC # BLD: 8.8 K/UL — SIGNIFICANT CHANGE UP (ref 3.8–10.5)
WBC # FLD AUTO: 8.8 K/UL — SIGNIFICANT CHANGE UP (ref 3.8–10.5)

## 2022-09-16 PROCEDURE — 99232 SBSQ HOSP IP/OBS MODERATE 35: CPT

## 2022-09-16 RX ADMIN — Medication 1 TABLET(S): at 18:32

## 2022-09-16 RX ADMIN — Medication 1 TABLET(S): at 05:30

## 2022-09-16 RX ADMIN — POLYETHYLENE GLYCOL 3350 17 GRAM(S): 17 POWDER, FOR SOLUTION ORAL at 12:20

## 2022-09-16 NOTE — DISCHARGE NOTE PROVIDER - CARE PROVIDER_API CALL
Your primary care provider,   Phone: (   )    -  Fax: (   )    -  Follow Up Time:     ,   Phone: [phone]  Fax: (   )    -  Follow Up Time:    Your primary care provider,   Phone: (   )    -  Fax: (   )    -  Follow Up Time:     ,   Phone: [phone]  Fax: (   )    -  Follow Up Time:     Nasreen CLARK in [place],   Phone: (   )    -  Fax: (   )    -  Follow Up Time:    Psychiatry AMBER in [place],   Phone: (   )    -  Fax: (   )    -  Follow Up Time:     Nicolas Palacio,   Phone: [phone]  Fax: (   )    -  Follow Up Time:     ,   Psychiatrist  Phone: [phone]  Fax: (   )    -  Follow Up Time:

## 2022-09-16 NOTE — DISCHARGE NOTE PROVIDER - PROVIDER TOKENS
FREE:[LAST:[Your primary care provider],PHONE:[(   )    -],FAX:[(   )    -]],FREE:[LAST:[],PHONE:[phone]:[(   )    -]] FREE:[LAST:[Your primary care provider],PHONE:[(   )    -],FAX:[(   )    -]],FREE:[LAST:[],PHONE:[phone]],FAX:[(   )    -]],FREE:[LAST:[Psychiatry [facility] in [place]],PHONE:[(   )    -],FAX:[(   )    -]] FREE:[LAST:[Psychiatry AMBER in [place]],PHONE:[(   )    -],FAX:[(   )    -]],FREE:[LAST:[Nicolas Palacio],PHONE:[phone]],FAX:[(   )    -]],FREE:[LAST:[],PHONE:[phone]],FAX:[(   )    -],ADDRESS:[Psychiatrist]]

## 2022-09-16 NOTE — DIETITIAN INITIAL EVALUATION ADULT - ADD RECOMMEND
1) Continue regular diet with no therapeutic diet restrictions  2) Recommend Ensure Enlive 2x daily (provides 350 kcal, 20 gm protein per 8oz serving). Pt will receive Ensure Plus HP to reflect the formulary.  3) Recommend MVI daily   4) Continue to honor pt preferences  5) Provide encouragement with meals  6) Monitor weights, labs, PO intake  7) RD to follow up PRN

## 2022-09-16 NOTE — DISCHARGE NOTE PROVIDER - NSDCFUADDAPPT_GEN_ALL_CORE_FT
PCP in 1-2 weeks   Outpatient [facility] [phone]; if interested in comprehensive speech evaluation as suggested by our language team  Please call and follow up with PCP in 1-2 weeks after discharge from the hospital    Outpatient [facility] [phone]; if interested in comprehensive speech evaluation as suggested by our language team please call this number to establish an appointment Please call and follow up with PCP in 1-2 weeks after discharge from the hospital.    Please call and follow up with your psychiatrist Dr.David Camejo [phone]) within 1 - 2 weeks after discharge from the hospital.     Outpatient [facility] [phone]; if interested in comprehensive speech evaluation as suggested by our language team please call this number to establish an appointment Please call and follow up with PCP in 1-2 weeks after discharge from the hospital.    Please call and follow up with Dr.David Camejo [phone]) within 1 - 2 weeks after discharge from the hospital.     Outpatient [facility] [phone]; if interested in comprehensive speech evaluation as suggested by our language team please call this number to establish an appointment Please call and follow up with PCP  Dr.David Camejo [phone]) in 1-2 weeks after discharge from the hospital.    Please call and follow up with Dr. Dela Cruz [phone]) within 1 - 2 weeks after discharge from the hospital.     Outpatient [facility] [phone]; if interested in comprehensive speech evaluation as suggested by our language team please call this number to establish an appointment

## 2022-09-16 NOTE — DISCHARGE NOTE PROVIDER - NSDCMRMEDTOKEN_GEN_ALL_CORE_FT
Abilify Maintena 400 mg intramuscular injection, extended release: 400 milligram(s) intramuscular every 4 weeks  bisacodyl 5 mg oral delayed release tablet: 1 tab(s) orally once a day  mercaptopurine 50 mg oral tablet: 1 tab(s) orally once a day on sunday, tuesday and thursday  MiraLax oral powder for reconstitution: 17 gram(s) orally 2 times a day  Oyster Paul 500 oral tablet: 1 tab(s) orally 2 times a day   Abilify Maintena 400 mg intramuscular injection, extended release: 400 milligram(s) intramuscular every 4 weeks  bisacodyl 5 mg oral delayed release tablet: 1 tab(s) orally once a day  mercaptopurine 50 mg oral tablet: 1 tab(s) orally once a day on sunday, tuesday and thursday  MiraLax oral powder for reconstitution: 17 gram(s) orally 2 times a day  Out Patient Physical Therapy: Out patient physical therapy  Oyster Paul 500 oral tablet: 1 tab(s) orally 2 times a day   Abilify Maintena 400 mg intramuscular injection, extended release: 400 milligram(s) intramuscular every 4 weeks  bisacodyl 5 mg oral delayed release tablet: 1 tab(s) orally once a day  MiraLax oral powder for reconstitution: 17 gram(s) orally 2 times a day  Out Patient Physical Therapy: Out patient physical therapy  Oyster Paul 500 oral tablet: 1 tab(s) orally 2 times a day   Abilify Maintena 400 mg intramuscular injection, extended release: 400 milligram(s) intramuscular every 4 weeks  bisacodyl 5 mg oral delayed release tablet: 1 tab(s) orally once a day  mercaptopurine 50 mg oral tablet: Give Mercaptopurine 50mg oral tablet on Sunday, Monday, Tuesday, Wednesday, Thursday, and Friday at 8:00AM  mercaptopurine 50 mg oral tablet: Please take Mercaptopurine 75mg every Saturday at 8:00am.   MiraLax oral powder for reconstitution: 17 gram(s) orally 2 times a day  Multiple Vitamins oral tablet: 1 tab(s) orally once a day  Out Patient Physical Therapy: Out patient physical therapy  Oyster Paul 500 oral tablet: 1 tab(s) orally 2 times a day

## 2022-09-16 NOTE — DIETITIAN INITIAL EVALUATION ADULT - PERTINENT MEDS FT
MEDICATIONS  (STANDING):  calcium carbonate   1250 mG (OsCal) 1 Tablet(s) Oral two times a day  influenza   Vaccine 0.5 milliLiter(s) IntraMuscular once  polyethylene glycol 3350 17 Gram(s) Oral daily    MEDICATIONS  (PRN):  acetaminophen     Tablet .. 650 milliGRAM(s) Oral every 6 hours PRN Temp greater or equal to 38C (100.4F), Mild Pain (1 - 3)  aluminum hydroxide/magnesium hydroxide/simethicone Suspension 30 milliLiter(s) Oral every 6 hours PRN Dyspepsia  LORazepam     Tablet 0.5 milliGRAM(s) Oral every 6 hours PRN Agitation  melatonin 3 milliGRAM(s) Oral at bedtime PRN Insomnia

## 2022-09-16 NOTE — DISCHARGE NOTE PROVIDER - NSDCCPCAREPLAN_GEN_ALL_CORE_FT
PRINCIPAL DISCHARGE DIAGNOSIS  Diagnosis: Diarrhea  Assessment and Plan of Treatment: During your stay you were monitored for diarrhea, you had a CT of the abdomen which showed no Crohn's flare up or acute issues. Your bowel movements were monitored and you had no diarrhea while you were admitted. It was determined you were safe for discharge and should see your primary care doctor in 1-2 weeks for further monitoring.      SECONDARY DISCHARGE DIAGNOSES  Diagnosis: Bipolar disorder  Assessment and Plan of Treatment: During your stay our psychiatric team followed you, there were no adjustments made to your psychiatric medications. Please continue to take all medications as previously directed.     PRINCIPAL DISCHARGE DIAGNOSIS  Diagnosis: Diarrhea  Assessment and Plan of Treatment: During your stay you were monitored for diarrhea, you had a CT of the abdomen which showed no Crohn's flare up or acute issues. Your bowel movements were monitored and you had no diarrhea while you were admitted. It was determined you were safe for discharge and should see your primary care doctor in 1-2 weeks for further monitoring.      SECONDARY DISCHARGE DIAGNOSES  Diagnosis: Bipolar disorder  Assessment and Plan of Treatment: During your stay our psychiatric team followed you, there were no adjustments made to your psychiatric medications. Please continue to take all medications as previously directed. Please follow up with your psychiatrist within 1 - 2 weeks after discharge.     PRINCIPAL DISCHARGE DIAGNOSIS  Diagnosis: Bipolar disorder  Assessment and Plan of Treatment: Autism Spectrum Disorder and Bipolar disorder - During your stay our psychiatric team followed you, there were no adjustments made to your psychiatric medications. Please continue to take all medications as previously directed. Please follow up with your psychiatrist at [facility] in [place] within 1 - 2 weeks after discharge.      SECONDARY DISCHARGE DIAGNOSES  Diagnosis: Crohn's disease  Assessment and Plan of Treatment: Continue your home meds including mercaptopurine as usual. Follow up with your GI doctor Dr. Maria Esther Quinn at [facility] as scheduled.    Diagnosis: Diarrhea  Assessment and Plan of Treatment: During your stay you were monitored for diarrhea, you had a CT of the abdomen which showed no Crohn's flare up or acute issues. Your bowel movements were monitored and you had no diarrhea while you were admitted. Follow up with your GI doctor Dr. Maria Esther Quinn at [facility] as scheduled.

## 2022-09-16 NOTE — DISCHARGE NOTE PROVIDER - HOSPITAL COURSE
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 Dela Cruz ([phone]) at [facility] (first appt was 8/24/22),  recent med admissions to [place] [facility] for behavioral issues, on Abilify  mg q4w (received this week at [facility], unclear exact date), history of behavioral disturbance in contexts of routine changes or when family members are ill and requiring multiple days away from home (last in February 2022, and now), admitted for diarrhea and weakness.     Bipolar disorder  - Reported increase in visual and auditory hallucination and behavioral disturbances relating to his grandmother being in hospital  - Evaluated by  on 9/10; low risk and advised outpatient follow up  - Now returning for "diarrhea" and concern his family can not care for him at home.  - Psych consulted - no antipsychotics, patient not requiring PRN medications for agitation     Diarrhea  - Reports watery diarrhea once per day, last episode per patient on 9/9 but per mother has been increasing and causing weakness.  - While in the ED there were no observed episodes of diarrhea, WBC normal and afebrile. No diarrhea observed while on unit.   - CT A/P performed without acute pathology and exam benign - doubt Crohn's flair.   - Diarrhea is in the setting of bisacodyl and Miralax use for chronic constipation, suspect overflow vs laxative induced diarrhea     Weakness  - Patient evaluated by PT and OT who are recommending rehab  - Language evaluation, patient would benefit from comprehensive speech evaluation outpatient     Crohn's disease.   - Continue mercaptopurine at home dose    Autism.   - Psych consult as above  - Family has been in progress of getting group home placement which was hoped to be completed 10/2022.   - Patient to be discharged to [facility]     Case discussed with  *****. Reviewed discharge medications with patient; All new medications requiring new prescription sent to pharmacy of patients choice. Reviewed need for prescription for previous home medication and new prescriptions sent if requested. Patient in agreement and understands.   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 Dela Cruz ([phone]) at [facility] (first appt was 8/24/22),  recent med admissions to [place] [facility] for behavioral issues, on Abilify  mg q4w (received this week at [facility], unclear exact date), history of behavioral disturbance in contexts of routine changes or when family members are ill and requiring multiple days away from home (last in February 2022, and now), admitted for diarrhea and weakness.     Bipolar disorder  - Reported increase in visual and auditory hallucination and behavioral disturbances relating to his grandmother being in hospital  - Evaluated by  on 9/10; low risk and advised outpatient follow up  - Returned for "diarrhea" and concern his family can not care for him at home.  - Psych consulted - no antipsychotics, patient not requiring PRN medications for agitation     Diarrhea  - Reported watery diarrhea once per day, last episode per patient on 9/9 but per mother has been increasing and causing weakness.  - While in the ED there were no observed episodes of diarrhea, WBC normal and afebrile. No diarrhea observed while on unit.   - CT A/P performed without acute pathology and exam benign - doubt Crohn's flair.   - Diarrhea is in the setting of bisacodyl and Miralax use for chronic constipation, suspect overflow vs laxative induced diarrhea     Weakness  - Patient evaluated by PT and OT who are recommending rehab (_____update for outpatient rehab once note is in chart____)  - Language evaluation, patient would benefit from comprehensive speech evaluation outpatient     Crohn's disease.   - Continue mercaptopurine at home dose    Autism.   - Psych consult as above  - Family has been in progress of getting group home placement which was hoped to be completed 10/2022.   - Patient to be discharged to [facility] (_____???____)    Case discussed with Dr Mullen on 9/20/22. Reviewed discharge medications with patient; All new medications requiring new prescription sent to pharmacy of patients choice. Reviewed need for prescription for previous home medication and new prescriptions sent if requested. Patient in agreement and understands.   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 Dela Cruz ([phone]) at [facility] (first appt was 8/24/22),  recent med admissions to [place] [facility] for behavioral issues, on Abilify  mg q4w (received this week at [facility], unclear exact date), history of behavioral disturbance in contexts of routine changes or when family members are ill and requiring multiple days away from home (last in February 2022, and now), admitted for diarrhea and weakness.     Bipolar disorder  - Reported increase in visual and auditory hallucination and behavioral disturbances relating to his grandmother being in hospital  - Evaluated by  on 9/10; low risk and advised outpatient follow up  - Returned for "diarrhea" and concern his family can not care for him at home.  - Psych consulted - no antipsychotics, patient not requiring PRN medications for agitation     Diarrhea  - Reported watery diarrhea once per day, last episode per patient on 9/9 but per mother has been increasing and causing weakness.  - While in the ED there were no observed episodes of diarrhea, WBC normal and afebrile. No diarrhea observed while on unit.   - CT A/P performed without acute pathology and exam benign - doubt Crohn's flair.   - Diarrhea is in the setting of bisacodyl and Miralax use for chronic constipation, suspect overflow vs laxative induced diarrhea     Weakness  - Patient evaluated by PT and OT who are not recommending subacute rehab, patient will benefit from out patient physical therapy  - Language evaluation, patient would benefit from comprehensive speech evaluation outpatient     Crohn's disease.   - Continue mercaptopurine at home dose    Autism.   - Psych consult as above  - Family has been in progress of getting group home placement which was hoped to be completed 10/2022.     Case discussed with Dr Mlulen on 9/20/22. Reviewed discharge medications with patient; All new medications requiring new prescription sent to pharmacy of patients choice. Reviewed need for prescription for previous home medication and new prescriptions sent if requested. Patient in agreement and understands.   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 Dela Cruz ([phone]) at [facility] (first appt was 8/24/22),  recent med admissions to [place] [facility] for behavioral issues, on Abilify  mg q4w (received this week at [facility], unclear exact date), history of behavioral disturbance in contexts of routine changes or when family members are ill and requiring multiple days away from home (last in February 2022, and now), admitted for diarrhea and weakness.     Bipolar disorder and Autism - Reported increase in visual and auditory hallucination and behavioral disturbances relating to his grandmother being in hospital and use of antipsychotics. Behavioral redirection, required few doses Ativan PRN. Mom declined standing antipsychotics, has already arranged outpt f/u with [facility] psych outpt.  Mom has also worked hard to set up pt for group home, tentative date 10/12.    Diarrhea - Reported watery diarrhea once per day, last episode per patient on 9/9 but per mother has been increasing and causing weakness. WBC normal and afebrile. No diarrhea observed while on unit. CT A/P performed without acute pathology and exam benign. Diarrhea is in the setting of bisacodyl and Miralax use for chronic constipation, suspect overflow vs laxative induced diarrhea. Lactose free diet.    Weakness - Pt walking around unit. Outpt PT. Speech therapy rec comprehensive speech evaluation outpatient     Crohn's disease. - Continue mercaptopurine at home dose    Case discussed with Dr Mullen on 9/20/22. Reviewed discharge medications with patient; All new medications requiring new prescription sent to pharmacy of patients choice. Reviewed need for prescription for previous home medication and new prescriptions sent if requested. Pt's mom in agreement and understands.   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 Dela Cruz ([phone]) at [facility] (first appt was 8/24/22),  recent med admissions to [place] [facility] for behavioral issues, on Abilify  mg q4w (received this week at [facility], unclear exact date), history of behavioral disturbance in contexts of routine changes or when family members are ill and requiring multiple days away from home (last in February 2022, and now), admitted for diarrhea and weakness.     Bipolar disorder and Autism - Reported increase in visual and auditory hallucination and behavioral disturbances relating to his grandmother being in hospital and use of antipsychotics. Behavioral redirection, required few doses Ativan PRN. Mom declined standing antipsychotics, has already arranged outpt f/u with [facility] psych outpt.  Mom has also worked hard to set up pt for group home, tentative date 10/2022.    Diarrhea - Reported watery diarrhea once per day, last episode per patient on 9/9 but per mother has been increasing and causing weakness. WBC normal and afebrile. No diarrhea observed while on unit. CT A/P performed without acute pathology and exam benign. Diarrhea is in the setting of bisacodyl and Miralax use for chronic constipation, suspect overflow vs laxative induced diarrhea. Lactose free diet.    Weakness - Pt walking around unit. Outpt PT. Speech therapy rec comprehensive speech evaluation outpatient     Crohn's disease. - Continue mercaptopurine at home dose    Case discussed with Dr Mullen on 9/20/22. Reviewed discharge medications with patient; All new medications requiring new prescription sent to pharmacy of patients choice. Reviewed need for prescription for previous home medication and new prescriptions sent if requested. Pt's mom in agreement and understands.

## 2022-09-16 NOTE — PROGRESS NOTE ADULT - NSPROGADDITIONALINFOA_GEN_ALL_CORE
Dispo planning- PT recs [facility] and pending placement    Discussed plan with mother at bedside on 9/16

## 2022-09-16 NOTE — DIETITIAN INITIAL EVALUATION ADULT - PERTINENT LABORATORY DATA
09-16 Na 137 mmol/L Glu 75 mg/dL K+ 3.8 mmol/L Cr 0.80 mg/dL BUN 8 mg/dL Phos 4.6 mg/dL<H>  09-16    A1C with Estimated Average Glucose Result: 5.2 % (09-13-22 @ 07:02)   Improved

## 2022-09-16 NOTE — PROGRESS NOTE ADULT - PROBLEM SELECTOR PLAN 2
Diarrhea is in the setting of bisacodyl and Miralax use for chronic constipation, suspect overflow vs laxative induced diarrhea- now resolved   - Reports watery diarrhea once per day, last episode per patient on 9/9 but per mother has been increasing and causing weakness.  - While in the ED there were no observed episodes of diarrhea, WBC normal and afebrile  - CT A/P performed without acute pathology and exam benign - doubt Crohn's flair    - GI PCR, C.Diff and stool culture/ O+P pending.  not able to be done since pt did not have a BM

## 2022-09-16 NOTE — PROGRESS NOTE ADULT - SUBJECTIVE AND OBJECTIVE BOX
Patient is a 22y old  Male who presents with a chief complaint of Other fatigue     (16 Sep 2022 14:42)      SUBJECTIVE / OVERNIGHT EVENTS:    No events overnight. This AM, patient without n/v/d/cp/sob.  He reports feeling better today and still seeing dead people. Mother at bedside and concerned about him feeling too weak to go home.       MEDICATIONS  (STANDING):  calcium carbonate   1250 mG (OsCal) 1 Tablet(s) Oral two times a day  influenza   Vaccine 0.5 milliLiter(s) IntraMuscular once  multivitamin 1 Tablet(s) Oral daily  polyethylene glycol 3350 17 Gram(s) Oral daily    MEDICATIONS  (PRN):  acetaminophen     Tablet .. 650 milliGRAM(s) Oral every 6 hours PRN Temp greater or equal to 38C (100.4F), Mild Pain (1 - 3)  aluminum hydroxide/magnesium hydroxide/simethicone Suspension 30 milliLiter(s) Oral every 6 hours PRN Dyspepsia  LORazepam     Tablet 0.5 milliGRAM(s) Oral every 6 hours PRN Agitation  melatonin 3 milliGRAM(s) Oral at bedtime PRN Insomnia      PHYSICAL EXAM:  T(C): 36.7 (09-16-22 @ 14:08), Max: 36.7 (09-16-22 @ 14:08)  HR: 109 (09-16-22 @ 14:08) (86 - 109)  BP: 119/81 (09-16-22 @ 14:08) (111/78 - 119/81)  RR: 16 (09-16-22 @ 14:08) (16 - 18)  SpO2: 100% (09-16-22 @ 14:08) (100% - 100%)  I&O's Summary    15 Sep 2022 07:01  -  16 Sep 2022 07:00  --------------------------------------------------------  IN: 120 mL / OUT: 0 mL / NET: 120 mL      GENERAL: NAD, well-developed  HEAD:  Atraumatic, Normocephalic, MMM  CHEST/LUNG: No use of accessory muscles, CTAB, breathing non-labored  COR: RR, no m/r/g  ABD: Soft, ND/NT, +BS  PSYCH: AAOx3 and still having auditory hallucinations  NEUROLOGY: CN II-XII grossly intact, moving all extremities  SKIN: No rashes or lesions  EXT: wwp, no cce      LABS:  CAPILLARY BLOOD GLUCOSE                              16.2   8.80  )-----------( 262      ( 16 Sep 2022 05:00 )             48.4     09-16    137  |  99  |  8   ----------------------------<  75  3.8   |  26  |  0.80    Ca    10.1      16 Sep 2022 05:00  Phos  4.6     09-16  Mg     2.00     09-16                  RADIOLOGY & ADDITIONAL TESTS:    Consultant(s) Notes Reviewed -       Care Discussed with Consultants/Other Providers -

## 2022-09-16 NOTE — DIETITIAN INITIAL EVALUATION ADULT - OTHER INFO
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     Interview: A&O x3. Spoke with patient and mother by bedside. Admission weight 160.4 lbs - no known changes in weight. Mom reports pt's height is 5'3" - discrepancy in chart that pt is 4'4". Will notify RN. 5'3" height used for BMI calculation below.  Mom reports pt has excellent appetite at home. Pt avoids pork and beef.  Admitted for diarrhea - taking multiple bowel medications at home likely cause of diarrhea. As per mom, pt no longer having diarrhea and has not had a BM since admission. NKFA.     As per mom, pt has not been eating well since admission. Mom brought food from home today, but states she will not be able to continue to bring him food regularly. Observed mom feeding patient his favorite food from home (pumpkin and rotti), but pt still presenting with minimal intake.  As per RN flowsheets, pt refusing meals due to dislike of food and requesting pizza and mac and cheese. Documented with <25% meal intake. Spoke with  - will provide mac and cheese at lunch and pizza at dinner. Preferences obtained and updated. Pt agreeable to trying ensure - requesting only vanilla.

## 2022-09-17 LAB
ANION GAP SERPL CALC-SCNC: 14 MMOL/L — SIGNIFICANT CHANGE UP (ref 7–14)
BUN SERPL-MCNC: 7 MG/DL — SIGNIFICANT CHANGE UP (ref 7–23)
CALCIUM SERPL-MCNC: 9.7 MG/DL — SIGNIFICANT CHANGE UP (ref 8.4–10.5)
CHLORIDE SERPL-SCNC: 98 MMOL/L — SIGNIFICANT CHANGE UP (ref 98–107)
CO2 SERPL-SCNC: 26 MMOL/L — SIGNIFICANT CHANGE UP (ref 22–31)
CREAT SERPL-MCNC: 0.82 MG/DL — SIGNIFICANT CHANGE UP (ref 0.5–1.3)
EGFR: 128 ML/MIN/1.73M2 — SIGNIFICANT CHANGE UP
GLUCOSE SERPL-MCNC: 86 MG/DL — SIGNIFICANT CHANGE UP (ref 70–99)
HCT VFR BLD CALC: 47.5 % — SIGNIFICANT CHANGE UP (ref 39–50)
HGB BLD-MCNC: 16.1 G/DL — SIGNIFICANT CHANGE UP (ref 13–17)
MAGNESIUM SERPL-MCNC: 2 MG/DL — SIGNIFICANT CHANGE UP (ref 1.6–2.6)
MCHC RBC-ENTMCNC: 27.8 PG — SIGNIFICANT CHANGE UP (ref 27–34)
MCHC RBC-ENTMCNC: 33.9 GM/DL — SIGNIFICANT CHANGE UP (ref 32–36)
MCV RBC AUTO: 81.9 FL — SIGNIFICANT CHANGE UP (ref 80–100)
NRBC # BLD: 0 /100 WBCS — SIGNIFICANT CHANGE UP (ref 0–0)
NRBC # FLD: 0 K/UL — SIGNIFICANT CHANGE UP (ref 0–0)
PHOSPHATE SERPL-MCNC: 4.8 MG/DL — HIGH (ref 2.5–4.5)
PLATELET # BLD AUTO: 255 K/UL — SIGNIFICANT CHANGE UP (ref 150–400)
POTASSIUM SERPL-MCNC: 3.7 MMOL/L — SIGNIFICANT CHANGE UP (ref 3.5–5.3)
POTASSIUM SERPL-SCNC: 3.7 MMOL/L — SIGNIFICANT CHANGE UP (ref 3.5–5.3)
RBC # BLD: 5.8 M/UL — SIGNIFICANT CHANGE UP (ref 4.2–5.8)
RBC # FLD: 13.7 % — SIGNIFICANT CHANGE UP (ref 10.3–14.5)
SODIUM SERPL-SCNC: 138 MMOL/L — SIGNIFICANT CHANGE UP (ref 135–145)
WBC # BLD: 7.09 K/UL — SIGNIFICANT CHANGE UP (ref 3.8–10.5)
WBC # FLD AUTO: 7.09 K/UL — SIGNIFICANT CHANGE UP (ref 3.8–10.5)

## 2022-09-17 PROCEDURE — 99232 SBSQ HOSP IP/OBS MODERATE 35: CPT

## 2022-09-17 RX ORDER — ONDANSETRON 8 MG/1
4 TABLET, FILM COATED ORAL ONCE
Refills: 0 | Status: COMPLETED | OUTPATIENT
Start: 2022-09-17 | End: 2022-09-17

## 2022-09-17 RX ADMIN — POLYETHYLENE GLYCOL 3350 17 GRAM(S): 17 POWDER, FOR SOLUTION ORAL at 12:38

## 2022-09-17 RX ADMIN — ONDANSETRON 4 MILLIGRAM(S): 8 TABLET, FILM COATED ORAL at 13:17

## 2022-09-17 RX ADMIN — Medication 0.5 MILLIGRAM(S): at 01:04

## 2022-09-17 RX ADMIN — Medication 1 TABLET(S): at 06:54

## 2022-09-17 RX ADMIN — Medication 3 MILLIGRAM(S): at 00:17

## 2022-09-17 RX ADMIN — Medication 1 TABLET(S): at 12:38

## 2022-09-17 NOTE — PROGRESS NOTE ADULT - SUBJECTIVE AND OBJECTIVE BOX
Patient is a 22y old  Male who presents with a chief complaint of Other fatigue     (16 Sep 2022 14:42)      SUBJECTIVE / OVERNIGHT EVENTS:    No events overnight. This AM, patient without n/v/d/cp/sob.      MEDICATIONS  (STANDING):  calcium carbonate   1250 mG (OsCal) 1 Tablet(s) Oral two times a day  influenza   Vaccine 0.5 milliLiter(s) IntraMuscular once  multivitamin 1 Tablet(s) Oral daily  polyethylene glycol 3350 17 Gram(s) Oral daily    MEDICATIONS  (PRN):  acetaminophen     Tablet .. 650 milliGRAM(s) Oral every 6 hours PRN Temp greater or equal to 38C (100.4F), Mild Pain (1 - 3)  aluminum hydroxide/magnesium hydroxide/simethicone Suspension 30 milliLiter(s) Oral every 6 hours PRN Dyspepsia  LORazepam     Tablet 0.5 milliGRAM(s) Oral every 6 hours PRN Agitation  melatonin 3 milliGRAM(s) Oral at bedtime PRN Insomnia      PHYSICAL EXAM:  T(C): 36.9 (09-17-22 @ 06:15), Max: 36.9 (09-17-22 @ 06:15)  HR: 92 (09-17-22 @ 06:15) (92 - 109)  BP: 115/82 (09-17-22 @ 06:15) (115/82 - 121/84)  RR: 18 (09-17-22 @ 06:15) (16 - 18)  SpO2: 100% (09-17-22 @ 06:15) (100% - 100%)  I&O's Summary    GENERAL: NAD, well-developed  HEAD:  Atraumatic, Normocephalic, MMM  CHEST/LUNG: No use of accessory muscles, CTAB, breathing non-labored  COR: RR, no mrcg  ABD: Soft, ND/NT, +BS  PSYCH: AAOx3  NEUROLOGY: CN II-XII grossly intact, moving all extremities  SKIN: No rashes or lesions  EXT: wwp, no cce    LABS:  CAPILLARY BLOOD GLUCOSE                              16.1   7.09  )-----------( 255      ( 17 Sep 2022 06:59 )             47.5     09-17    138  |  98  |  7   ----------------------------<  86  3.7   |  26  |  0.82    Ca    9.7      17 Sep 2022 06:59  Phos  4.8     09-17  Mg     2.00     09-17                  RADIOLOGY & ADDITIONAL TESTS:    Consultant(s) Notes Reviewed -       Care Discussed with Consultants/Other Providers -

## 2022-09-18 LAB
ANION GAP SERPL CALC-SCNC: 10 MMOL/L — SIGNIFICANT CHANGE UP (ref 7–14)
BUN SERPL-MCNC: 8 MG/DL — SIGNIFICANT CHANGE UP (ref 7–23)
CALCIUM SERPL-MCNC: 9.4 MG/DL — SIGNIFICANT CHANGE UP (ref 8.4–10.5)
CHLORIDE SERPL-SCNC: 101 MMOL/L — SIGNIFICANT CHANGE UP (ref 98–107)
CO2 SERPL-SCNC: 26 MMOL/L — SIGNIFICANT CHANGE UP (ref 22–31)
CREAT SERPL-MCNC: 0.88 MG/DL — SIGNIFICANT CHANGE UP (ref 0.5–1.3)
EGFR: 125 ML/MIN/1.73M2 — SIGNIFICANT CHANGE UP
GLUCOSE SERPL-MCNC: 78 MG/DL — SIGNIFICANT CHANGE UP (ref 70–99)
HCT VFR BLD CALC: 43.8 % — SIGNIFICANT CHANGE UP (ref 39–50)
HGB BLD-MCNC: 14.9 G/DL — SIGNIFICANT CHANGE UP (ref 13–17)
MAGNESIUM SERPL-MCNC: 2 MG/DL — SIGNIFICANT CHANGE UP (ref 1.6–2.6)
MCHC RBC-ENTMCNC: 27.7 PG — SIGNIFICANT CHANGE UP (ref 27–34)
MCHC RBC-ENTMCNC: 34 GM/DL — SIGNIFICANT CHANGE UP (ref 32–36)
MCV RBC AUTO: 81.6 FL — SIGNIFICANT CHANGE UP (ref 80–100)
NRBC # BLD: 0 /100 WBCS — SIGNIFICANT CHANGE UP (ref 0–0)
NRBC # FLD: 0 K/UL — SIGNIFICANT CHANGE UP (ref 0–0)
PHOSPHATE SERPL-MCNC: 4.1 MG/DL — SIGNIFICANT CHANGE UP (ref 2.5–4.5)
PLATELET # BLD AUTO: 234 K/UL — SIGNIFICANT CHANGE UP (ref 150–400)
POTASSIUM SERPL-MCNC: 4.2 MMOL/L — SIGNIFICANT CHANGE UP (ref 3.5–5.3)
POTASSIUM SERPL-SCNC: 4.2 MMOL/L — SIGNIFICANT CHANGE UP (ref 3.5–5.3)
RBC # BLD: 5.37 M/UL — SIGNIFICANT CHANGE UP (ref 4.2–5.8)
RBC # FLD: 13.8 % — SIGNIFICANT CHANGE UP (ref 10.3–14.5)
SODIUM SERPL-SCNC: 137 MMOL/L — SIGNIFICANT CHANGE UP (ref 135–145)
WBC # BLD: 6.41 K/UL — SIGNIFICANT CHANGE UP (ref 3.8–10.5)
WBC # FLD AUTO: 6.41 K/UL — SIGNIFICANT CHANGE UP (ref 3.8–10.5)

## 2022-09-18 PROCEDURE — 99232 SBSQ HOSP IP/OBS MODERATE 35: CPT

## 2022-09-18 RX ORDER — ONDANSETRON 8 MG/1
4 TABLET, FILM COATED ORAL ONCE
Refills: 0 | Status: COMPLETED | OUTPATIENT
Start: 2022-09-18 | End: 2022-09-18

## 2022-09-18 RX ADMIN — Medication 1 TABLET(S): at 12:11

## 2022-09-18 RX ADMIN — Medication 3 MILLIGRAM(S): at 03:03

## 2022-09-18 RX ADMIN — Medication 0.5 MILLIGRAM(S): at 03:24

## 2022-09-18 RX ADMIN — ONDANSETRON 4 MILLIGRAM(S): 8 TABLET, FILM COATED ORAL at 15:48

## 2022-09-18 RX ADMIN — Medication 1 TABLET(S): at 06:07

## 2022-09-18 RX ADMIN — POLYETHYLENE GLYCOL 3350 17 GRAM(S): 17 POWDER, FOR SOLUTION ORAL at 12:11

## 2022-09-18 RX ADMIN — Medication 1 TABLET(S): at 17:34

## 2022-09-18 NOTE — PROGRESS NOTE ADULT - SUBJECTIVE AND OBJECTIVE BOX
Patient is a 22y old  Male who presents with a chief complaint of Hallucination (17 Sep 2022 09:30)      SUBJECTIVE / OVERNIGHT EVENTS:    No events overnight. This AM, patient without n/v/d/cp/sob.      MEDICATIONS  (STANDING):  calcium carbonate   1250 mG (OsCal) 1 Tablet(s) Oral two times a day  influenza   Vaccine 0.5 milliLiter(s) IntraMuscular once  multivitamin 1 Tablet(s) Oral daily  polyethylene glycol 3350 17 Gram(s) Oral daily    MEDICATIONS  (PRN):  acetaminophen     Tablet .. 650 milliGRAM(s) Oral every 6 hours PRN Temp greater or equal to 38C (100.4F), Mild Pain (1 - 3)  aluminum hydroxide/magnesium hydroxide/simethicone Suspension 30 milliLiter(s) Oral every 6 hours PRN Dyspepsia  LORazepam     Tablet 0.5 milliGRAM(s) Oral every 6 hours PRN Agitation  melatonin 3 milliGRAM(s) Oral at bedtime PRN Insomnia      PHYSICAL EXAM:  T(C): 36.8 (09-18-22 @ 06:04), Max: 37 (09-17-22 @ 14:25)  HR: 88 (09-18-22 @ 06:04) (88 - 100)  BP: 112/73 (09-18-22 @ 06:04) (110/75 - 121/85)  RR: 18 (09-18-22 @ 06:04) (17 - 18)  SpO2: 100% (09-18-22 @ 06:04) (100% - 100%)  I&O's Summary    GENERAL: NAD, well-developed  HEAD:  Atraumatic, Normocephalic, MMM  CHEST/LUNG: No use of accessory muscles, CTAB, breathing non-labored  COR: RR, no mrcg  ABD: Soft, ND/NT, +BS  PSYCH: AAOx3  NEUROLOGY: CN II-XII grossly intact, moving all extremities  SKIN: No rashes or lesions  EXT: wwp, no cce    LABS:  CAPILLARY BLOOD GLUCOSE                              14.9   6.41  )-----------( 234      ( 18 Sep 2022 06:45 )             43.8     09-18    137  |  101  |  8   ----------------------------<  78  4.2   |  26  |  0.88    Ca    9.4      18 Sep 2022 06:45  Phos  4.1     09-18  Mg     2.00     09-18                  RADIOLOGY & ADDITIONAL TESTS:      Care Discussed with Consultants/Other Providers -

## 2022-09-18 NOTE — PROGRESS NOTE ADULT - PROBLEM SELECTOR PLAN 2
Diarrhea is in the setting of bisacodyl and Miralax use for chronic constipation, suspect overflow vs laxative induced diarrhea- now resolved   - Reports watery diarrhea once per day, last episode per patient on 9/9 but per mother has been increasing and causing weakness.  - While in the ED there were no observed episodes of diarrhea, WBC normal and afebrile  - CT A/P performed without acute pathology and exam benign - doubt Crohn's flair    - GI PCR, C.Diff and stool culture/ O+P pending

## 2022-09-18 NOTE — PROVIDER CONTACT NOTE (OTHER) - BACKGROUND
Pt is 22M admitted with auditory and visual hallucination. PMH of Autism disorder, bipolar disorder. Pt is AOx3 at baseline.

## 2022-09-18 NOTE — PROVIDER CONTACT NOTE (OTHER) - ACTION/TREATMENT ORDERED:
BALJINDER Varghese encourages PRN Lorazepam .5 and melatonin 3mg for agitation/ insomnia. Med given. Will continue to monitor.

## 2022-09-19 LAB
ANION GAP SERPL CALC-SCNC: 10 MMOL/L — SIGNIFICANT CHANGE UP (ref 7–14)
BUN SERPL-MCNC: 8 MG/DL — SIGNIFICANT CHANGE UP (ref 7–23)
CALCIUM SERPL-MCNC: 9.8 MG/DL — SIGNIFICANT CHANGE UP (ref 8.4–10.5)
CHLORIDE SERPL-SCNC: 100 MMOL/L — SIGNIFICANT CHANGE UP (ref 98–107)
CO2 SERPL-SCNC: 27 MMOL/L — SIGNIFICANT CHANGE UP (ref 22–31)
CREAT SERPL-MCNC: 0.87 MG/DL — SIGNIFICANT CHANGE UP (ref 0.5–1.3)
EGFR: 126 ML/MIN/1.73M2 — SIGNIFICANT CHANGE UP
GLUCOSE SERPL-MCNC: 89 MG/DL — SIGNIFICANT CHANGE UP (ref 70–99)
HCT VFR BLD CALC: 43.8 % — SIGNIFICANT CHANGE UP (ref 39–50)
HGB BLD-MCNC: 14.9 G/DL — SIGNIFICANT CHANGE UP (ref 13–17)
MAGNESIUM SERPL-MCNC: 2.1 MG/DL — SIGNIFICANT CHANGE UP (ref 1.6–2.6)
MCHC RBC-ENTMCNC: 27.7 PG — SIGNIFICANT CHANGE UP (ref 27–34)
MCHC RBC-ENTMCNC: 34 GM/DL — SIGNIFICANT CHANGE UP (ref 32–36)
MCV RBC AUTO: 81.4 FL — SIGNIFICANT CHANGE UP (ref 80–100)
NRBC # BLD: 0 /100 WBCS — SIGNIFICANT CHANGE UP (ref 0–0)
NRBC # FLD: 0 K/UL — SIGNIFICANT CHANGE UP (ref 0–0)
PHOSPHATE SERPL-MCNC: 4.8 MG/DL — HIGH (ref 2.5–4.5)
PLATELET # BLD AUTO: 247 K/UL — SIGNIFICANT CHANGE UP (ref 150–400)
POTASSIUM SERPL-MCNC: 3.9 MMOL/L — SIGNIFICANT CHANGE UP (ref 3.5–5.3)
POTASSIUM SERPL-SCNC: 3.9 MMOL/L — SIGNIFICANT CHANGE UP (ref 3.5–5.3)
RBC # BLD: 5.38 M/UL — SIGNIFICANT CHANGE UP (ref 4.2–5.8)
RBC # FLD: 13.6 % — SIGNIFICANT CHANGE UP (ref 10.3–14.5)
SODIUM SERPL-SCNC: 137 MMOL/L — SIGNIFICANT CHANGE UP (ref 135–145)
WBC # BLD: 7.89 K/UL — SIGNIFICANT CHANGE UP (ref 3.8–10.5)
WBC # FLD AUTO: 7.89 K/UL — SIGNIFICANT CHANGE UP (ref 3.8–10.5)

## 2022-09-19 PROCEDURE — 99231 SBSQ HOSP IP/OBS SF/LOW 25: CPT

## 2022-09-19 PROCEDURE — 99232 SBSQ HOSP IP/OBS MODERATE 35: CPT

## 2022-09-19 RX ORDER — MERCAPTOPURINE 50 MG/1
75 TABLET ORAL
Refills: 0 | Status: DISCONTINUED | OUTPATIENT
Start: 2022-09-24 | End: 2022-09-20

## 2022-09-19 RX ORDER — MERCAPTOPURINE 50 MG/1
50 TABLET ORAL
Refills: 0 | Status: DISCONTINUED | OUTPATIENT
Start: 2022-09-19 | End: 2022-09-20

## 2022-09-19 RX ADMIN — Medication 1 TABLET(S): at 17:18

## 2022-09-19 RX ADMIN — Medication 1 TABLET(S): at 06:29

## 2022-09-19 RX ADMIN — POLYETHYLENE GLYCOL 3350 17 GRAM(S): 17 POWDER, FOR SOLUTION ORAL at 11:35

## 2022-09-19 RX ADMIN — Medication 1 TABLET(S): at 11:35

## 2022-09-19 RX ADMIN — Medication 0.5 MILLIGRAM(S): at 01:43

## 2022-09-19 NOTE — BH CONSULTATION LIAISON PROGRESS NOTE - NSBHCHARTREVIEWVS_PSY_A_CORE FT
Vital Signs Last 24 Hrs  T(C): 36.8 (14 Sep 2022 11:13), Max: 36.8 (14 Sep 2022 11:13)  T(F): 98.2 (14 Sep 2022 11:13), Max: 98.2 (14 Sep 2022 11:13)  HR: 88 (14 Sep 2022 11:13) (78 - 88)  BP: 119/85 (14 Sep 2022 11:13) (119/85 - 136/89)  BP(mean): --  RR: 18 (14 Sep 2022 11:13) (18 - 18)  SpO2: 99% (14 Sep 2022 11:13) (97% - 100%)    Parameters below as of 14 Sep 2022 11:13  Patient On (Oxygen Delivery Method): room air    
Vital Signs Last 24 Hrs  T(C): 36.5 (19 Sep 2022 14:55), Max: 37.2 (18 Sep 2022 20:25)  T(F): 97.7 (19 Sep 2022 14:55), Max: 98.9 (18 Sep 2022 20:25)  HR: 103 (19 Sep 2022 14:55) (92 - 104)  BP: 126/87 (19 Sep 2022 14:55) (115/74 - 126/87)  BP(mean): --  RR: 18 (19 Sep 2022 14:55) (18 - 18)  SpO2: 100% (19 Sep 2022 14:55) (100% - 100%)    Parameters below as of 19 Sep 2022 14:55  Patient On (Oxygen Delivery Method): room air

## 2022-09-19 NOTE — BH CONSULTATION LIAISON PROGRESS NOTE - NSBHASSESSMENTFT_PSY_ALL_CORE
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 Dela Cruz ([phone]) at [facility] (first appt was 8/24/22),  recent med admissions to [place] [facility] for behavioral issues, on Abilify  mg q4w (received this week at [facility], unclear exact date), history of behavioral disturbance in contexts of routine changes or when family members are ill and requiring multiple days away from home (last in February 2022, and now), who was seen in the psych ER two days ago for concern of diarrhea, drooling and he was ultimately discharged back home.    9/14 - mild hallucinations continue but mood stable, no SI/HI no agitation/aggression, discsused with mother, no utility in [facility] admission at this time, would return to home with outpt f/u with Dr. Dela Cruz. Safety plan discussed at length with mother    9/19: seen for f/u at mother's request per primary team, mother expresses concern for patient's gait, denies any psychiatric concerns, primary team made aware, patient calm and cooperative, engaged in interview, able to follow commands, denies si/hi, denies ah/vh, denies sleep/appetite disturbances. Mother/reports outpatient appointment with outpatient psychiatrist next week.    Recs:  -doing well on 7S  -No antipsychotics  -Can use ativan 0.5mg q6h prn for agitation  -return to home when medically stable  -not a candidate for [facility] admission.   -No psychiatric contraindications to discharge
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 Dela Cruz ([phone]) at [facility] (first appt was 8/24/22),  recent med admissions to [place] / [facility] for behavioral issues, on Abilify  mg q4w (received this week at [facility], unclear exact date), history of behavioral disturbance in contexts of routine changes or when family members are ill and requiring multiple days away from home (last in February 2022, and now), who was seen in the psych ER two days ago for concern of diarrhea, drooling and he was ultimately discharged back home.    9/14 - mild hallucinations continue but mood stable, no SI/HI no agitation/aggression, discsused with mother, no utility in [facility] admission at this time, would return to home with outpt f/u with Dr. Dela Cruz. Safety plan discussed at length with mother    Recs:  -doing well on 7S  -No antipsychotics  -Can use ativan 0.5mg q6h prn for agitation  -return to home when able  -not a candidate for [facility] admission.

## 2022-09-19 NOTE — PROGRESS NOTE ADULT - PROBLEM SELECTOR PLAN 1
- Reported increase in visual and auditory hallucination and behavioral disturbances relating to his grandmother being in hospital and about Abilify   - Evaluated by  on 9/10; low risk and advised outpatient follow up  - Now returning for "diarrhea" and concern his family can not care for him at home.  - Psych consult called for further evaluation and recs appreciated- do not think pt needs inpt psych and go home with outpt follow up - Reported increase in visual and auditory hallucination and behavioral disturbances relating to his grandmother being in hospital and about Abilify   - Evaluated by  on 9/10; low risk and advised outpatient follow up  - Now returning for "diarrhea" and concern his family can not care for him at home.  - appreciate psych input - pt has stabilized. Mother declines antipsychotics or other standing meds. Mom has worked hard to secure group home placement for pt in mid October and has also set him up with outpt psych at [facility] in [place]. Will monitor over night and aim for d/c home tomorrow.

## 2022-09-19 NOTE — BH CONSULTATION LIAISON PROGRESS NOTE - NSBHFUPINTERVALHXFT_PSY_A_CORE
Consulted by primary team for follow up at mother's request.     Chart reviewed. Patient received Ativan 0.5mg today. Patient seen, mother and mother's friend at bedside, patient sitting upright in bed, calm, cooperative, reports mood as "good," able to engage in interview, able to follow commands, patient denies suicidal/homicidal ideation, denies auditory/visual hallucinations, denies sleep/appetite disturbances.    Mother states she has no psychiatric issues but has issue with patient being medically discharged before she can see him walk for 5 minutes-patient seen by PT and witnessed walking per primary care provider, Dr. Paz MD made aware of mother's concerns. Discussed with MD no psychiatric contraindications to discharge.     Mother reports patient has outpatient psychiatric appointment next week. 
patient still sees dead famous people, most notably mariam mendez, is somewhat bothered by this but denies SI/HI, feels sad in hospital    Discussed with mother - would NOT recommend psychiatric hospitalization as patient likely to do worse behaviorally with a new change in environment again given his autism, mother expresses agreement, is amenable to DC to home likely later this week

## 2022-09-19 NOTE — BH CONSULTATION LIAISON PROGRESS NOTE - CURRENT MEDICATION
MEDICATIONS  (STANDING):  calcium carbonate   1250 mG (OsCal) 1 Tablet(s) Oral two times a day  influenza   Vaccine 0.5 milliLiter(s) IntraMuscular once  multivitamin 1 Tablet(s) Oral daily  polyethylene glycol 3350 17 Gram(s) Oral daily    MEDICATIONS  (PRN):  acetaminophen     Tablet .. 650 milliGRAM(s) Oral every 6 hours PRN Temp greater or equal to 38C (100.4F), Mild Pain (1 - 3)  aluminum hydroxide/magnesium hydroxide/simethicone Suspension 30 milliLiter(s) Oral every 6 hours PRN Dyspepsia  LORazepam     Tablet 0.5 milliGRAM(s) Oral every 6 hours PRN Agitation  melatonin 3 milliGRAM(s) Oral at bedtime PRN Insomnia  
MEDICATIONS  (STANDING):  calcium carbonate   1250 mG (OsCal) 1 Tablet(s) Oral two times a day  influenza   Vaccine 0.5 milliLiter(s) IntraMuscular once    MEDICATIONS  (PRN):  acetaminophen     Tablet .. 650 milliGRAM(s) Oral every 6 hours PRN Temp greater or equal to 38C (100.4F), Mild Pain (1 - 3)  aluminum hydroxide/magnesium hydroxide/simethicone Suspension 30 milliLiter(s) Oral every 6 hours PRN Dyspepsia  LORazepam     Tablet 0.5 milliGRAM(s) Oral every 6 hours PRN Agitation  melatonin 3 milliGRAM(s) Oral at bedtime PRN Insomnia

## 2022-09-19 NOTE — BH CONSULTATION LIAISON PROGRESS NOTE - NSBHCONSULTFOLLOWAFTERCARE_PSY_A_CORE FT
Return to home when family able ot take him, please copy/paste info below into dc paperwork:    Should f/u with Dr. Dela Cruz at [facility] on 9/28 at 9:30am  Would consider discussing new medication Olanzapine in future (different sfx profile from risperdal, haldol, and abilify)    If needed can f/u ad hoc with [facility]  [address] [place] [address], [address] Floor, [place] [address] [phone]  [email or web address]  
Return to home when family able ot take him, please copy/paste info below into dc paperwork:    Should f/u with Dr. Dela Cruz at [facility] on 9/28 at 9:30am  Would consider discussing new medication Olanzapine in future (different sfx profile from risperdal, haldol, and abilify)    If needed can f/u ad hoc with [facility]  [address] [place] [address], [address] Floor, [place] [address] [phone]  [email or web address]

## 2022-09-19 NOTE — BH CONSULTATION LIAISON PROGRESS NOTE - NSBHCHARTREVIEWLAB_PSY_A_CORE FT
15.3   8.35  )-----------( 283      ( 11 Sep 2022 20:20 )             45.1   09-12    136  |  100  |  8   ----------------------------<  97  3.6   |  25  |  0.78    Ca    8.6      12 Sep 2022 02:17  Mg     2.00     09-11    TPro  7.2  /  Alb  4.1  /  TBili  0.4  /  DBili  x   /  AST  69<H>  /  ALT  27  /  AlkPhos  44  09-11  
CBC Full  -  ( 19 Sep 2022 06:13 )  WBC Count : 7.89 K/uL  RBC Count : 5.38 M/uL  Hemoglobin : 14.9 g/dL  Hematocrit : 43.8 %  Platelet Count - Automated : 247 K/uL  Mean Cell Volume : 81.4 fL  Mean Cell Hemoglobin : 27.7 pg  Mean Cell Hemoglobin Concentration : 34.0 gm/dL  Auto Neutrophil # : x  Auto Lymphocyte # : x  Auto Monocyte # : x  Auto Eosinophil # : x  Auto Basophil # : x  Auto Neutrophil % : x  Auto Lymphocyte % : x  Auto Monocyte % : x  Auto Eosinophil % : x  Auto Basophil % : x  09-19    137  |  100  |  8   ----------------------------<  89  3.9   |  27  |  0.87    Ca    9.8      19 Sep 2022 06:13  Phos  4.8     09-19  Mg     2.10     09-19

## 2022-09-19 NOTE — PROGRESS NOTE ADULT - NSPROGADDITIONALINFOA_GEN_ALL_CORE
Dispo planning- PT recs [facility] and pending placement    Discussed plan with mother at bedside on 9/16 pt walking in [place], no need for rehab, will give script for home PT  aim for d/c tomorrow with pt's father

## 2022-09-19 NOTE — BH CONSULTATION LIAISON PROGRESS NOTE - MSE UNSTRUCTURED FT
Mental Status Exam:  Manan: well groomed, fair hygiene, drooling  Behavior: calm, cooperative, engage in interview  Motor: no tremors  Gait: did not assess, pt in bed  Speech: normal rate, rhythm, prosody and volume   Mood: "good"  Thought process: linear  Thought Content: denies paranoia, delusions  Perception: denies AH/VH   SI: denies  HI: denies  Insight: limited  Judgment: fair    Cognitive Exam:  Orientation: AO    
Mental Status Exam:  Manan: well groomed, fair hygiene, drooling  Behavior: anxious, restless  Motor: no tremors, EPS, or rigidity  Gait: did not assess, pt in bed  Speech: normal rate, rhythm, prosody and volume   Mood: "not good"  Affect: anxious  Thought process: perseverative  Thought Content: denies paranoia, delusions, worried about medication  Perception: +AH/VH though does not appear to be overtly internally preoccupied  SI: denies  HI: denies  Insight: limited  Judgment: fair    Cognitive Exam:  Orientation: AO  Attention: impaired

## 2022-09-19 NOTE — BH CONSULTATION LIAISON PROGRESS NOTE - NSBHCHARTREVIEWINVESTIGATE_PSY_A_CORE FT
Ventricular Rate 116 BPM    Atrial Rate 116 BPM    P-R Interval 122 ms    QRS Duration 72 ms    Q-T Interval 320 ms    QTC Calculation(Bazett) 444 ms    P Axis 34 degrees    R Axis 74 degrees    T Axis 32 degrees    Diagnosis Line Sinus tachycardia  Possible Left atrial enlargement  Nonspecific T wave abnormality  Abnormal ECG

## 2022-09-19 NOTE — PROGRESS NOTE ADULT - SUBJECTIVE AND OBJECTIVE BOX
Patient is a 22y old  Male who presents with a chief complaint of Hallucination (18 Sep 2022 08:54)    SUBJECTIVE / OVERNIGHT EVENTS:  No problems reported over night.     MEDICATIONS  (STANDING):  calcium carbonate   1250 mG (OsCal) 1 Tablet(s) Oral two times a day  influenza   Vaccine 0.5 milliLiter(s) IntraMuscular once  multivitamin 1 Tablet(s) Oral daily  polyethylene glycol 3350 17 Gram(s) Oral daily    MEDICATIONS  (PRN):  acetaminophen     Tablet .. 650 milliGRAM(s) Oral every 6 hours PRN Temp greater or equal to 38C (100.4F), Mild Pain (1 - 3)  aluminum hydroxide/magnesium hydroxide/simethicone Suspension 30 milliLiter(s) Oral every 6 hours PRN Dyspepsia  LORazepam     Tablet 0.5 milliGRAM(s) Oral every 6 hours PRN Agitation  melatonin 3 milliGRAM(s) Oral at bedtime PRN Insomnia    Vital Signs Last 24 Hrs  T(C): 37 (19 Sep 2022 06:00), Max: 37.2 (18 Sep 2022 20:25)  T(F): 98.6 (19 Sep 2022 06:00), Max: 98.9 (18 Sep 2022 20:25)  HR: 92 (19 Sep 2022 06:00) (92 - 104)  BP: 115/74 (19 Sep 2022 06:00) (111/68 - 117/79)  BP(mean): --  RR: 18 (19 Sep 2022 06:00) (17 - 18)  SpO2: 100% (19 Sep 2022 06:00) (100% - 100%)    Parameters below as of 19 Sep 2022 06:00  Patient On (Oxygen Delivery Method): room air    GENERAL: NAD, well-developed  HEAD:  Atraumatic, Normocephalic, MMM  CHEST/LUNG: No use of accessory muscles, CTAB, breathing non-labored  COR: RR, no mrcg  ABD: Soft, ND/NT, +BS  PSYCH: AAOx3  NEUROLOGY: CN II-XII grossly intact, moving all extremities  SKIN: No rashes or lesions  EXT: wwp, no cce    LABS:                        14.9   7.89  )-----------( 247      ( 19 Sep 2022 06:13 )             43.8     09-19    137  |  100  |  8   ----------------------------<  89  3.9   |  27  |  0.87    Ca    9.8      19 Sep 2022 06:13  Phos  4.8     09-19  Mg     2.10     09-19    RADIOLOGY & ADDITIONAL TESTS:    Imaging Personally Reviewed:    Consultant(s) Notes Reviewed:      Care Discussed with Consultants/Other Providers: RN, SW, CM, ACP re overall care  Patient is a 22y old  Male who presents with a chief complaint of Hallucination (18 Sep 2022 08:54)    SUBJECTIVE / OVERNIGHT EVENTS:  Given PRN x1 over night for restlessness.   Seen this morning, sitting calmly in bed, walked around unit with patient, cooperative, steady. Mother visited, concerned about his stability. Would like to monitor for 24 more hours.    MEDICATIONS  (STANDING):  calcium carbonate   1250 mG (OsCal) 1 Tablet(s) Oral two times a day  influenza   Vaccine 0.5 milliLiter(s) IntraMuscular once  multivitamin 1 Tablet(s) Oral daily  polyethylene glycol 3350 17 Gram(s) Oral daily    MEDICATIONS  (PRN):  acetaminophen     Tablet .. 650 milliGRAM(s) Oral every 6 hours PRN Temp greater or equal to 38C (100.4F), Mild Pain (1 - 3)  aluminum hydroxide/magnesium hydroxide/simethicone Suspension 30 milliLiter(s) Oral every 6 hours PRN Dyspepsia  LORazepam     Tablet 0.5 milliGRAM(s) Oral every 6 hours PRN Agitation  melatonin 3 milliGRAM(s) Oral at bedtime PRN Insomnia    Vital Signs Last 24 Hrs  T(C): 37 (19 Sep 2022 06:00), Max: 37.2 (18 Sep 2022 20:25)  T(F): 98.6 (19 Sep 2022 06:00), Max: 98.9 (18 Sep 2022 20:25)  HR: 92 (19 Sep 2022 06:00) (92 - 104)  BP: 115/74 (19 Sep 2022 06:00) (111/68 - 117/79)  BP(mean): --  RR: 18 (19 Sep 2022 06:00) (17 - 18)  SpO2: 100% (19 Sep 2022 06:00) (100% - 100%)    Parameters below as of 19 Sep 2022 06:00  Patient On (Oxygen Delivery Method): room air    GENERAL: young autistic man, cooperative  HEAD:  Atraumatic, Normocephalic, MMM  CHEST/LUNG: No use of accessory muscles, CTAB, breathing non-labored  COR: RR, no mrcg  ABD: Soft, ND/NT, +BS  PSYCH: calm, oriented Sept 18, 2022, [place], "[facility]"  NEUROLOGY: walking in [place], steady  SKIN: No rashes or lesions  EXT: wwp, no cce    LABS:                        14.9   7.89  )-----------( 247      ( 19 Sep 2022 06:13 )             43.8     09-19    137  |  100  |  8   ----------------------------<  89  3.9   |  27  |  0.87    Ca    9.8      19 Sep 2022 06:13  Phos  4.8     09-19  Mg     2.10     09-19    RADIOLOGY & ADDITIONAL TESTS:    Imaging Personally Reviewed:    Consultant(s) Notes Reviewed:      Care Discussed with Consultants/Other Providers: RN, SW, CM, ACP re overall care

## 2022-09-19 NOTE — PROGRESS NOTE ADULT - PROBLEM SELECTOR PLAN 2
Diarrhea is in the setting of bisacodyl and Miralax use for chronic constipation, suspect overflow vs laxative induced diarrhea- now resolved   - Reports watery diarrhea once per day, last episode per patient on 9/9 but per mother has been increasing and causing weakness.  - While in the ED there were no observed episodes of diarrhea, WBC normal and afebrile  - CT A/P performed without acute pathology and exam benign - doubt Crohn's flair    - GI PCR, C.Diff and stool culture/ O+P pending Diarrhea is in the setting of bisacodyl and Miralax use for chronic constipation, suspect overflow vs laxative induced diarrhea- now resolved   - Reports watery diarrhea once per day, last episode per patient on 9/9 but per mother has been increasing and causing weakness.  - While in the ED there were no observed episodes of diarrhea, WBC normal and afebrile  - CT A/P performed without acute pathology and exam benign - doubt Crohn's flair    - GI PCR, C.Diff and stool culture/ O+P pending -  no diarrhea reported...

## 2022-09-19 NOTE — PROGRESS NOTE ADULT - PROBLEM SELECTOR PLAN 3
- Continue mercaptopurine at home dose  - will restart laxatives - d/w mom - mercaptopurine dose 50mg qd, 75mg on Saturdays  - will restart laxatives  Dr. Maria Esther BLAKE at [facility] - d/w mom - mercaptopurine dose 50mg qd, 75mg on Saturdays, not been on while here, d/w GI will resume  Dr. Maria Esther BLAKE at [facility] - d/w mom - mercaptopurine dose 50mg qd, 75mg on Saturdays, not been on while here, d/w GI ok to resume at usual dose, to f/u outpt with GI (mom rescheduling for visit in near future)  Dr. Maria Esther BLAKE at [facility]

## 2022-09-20 ENCOUNTER — TRANSCRIPTION ENCOUNTER (OUTPATIENT)
Age: 22
End: 2022-09-20

## 2022-09-20 VITALS
OXYGEN SATURATION: 100 % | SYSTOLIC BLOOD PRESSURE: 148 MMHG | DIASTOLIC BLOOD PRESSURE: 85 MMHG | RESPIRATION RATE: 20 BRPM | HEART RATE: 103 BPM | TEMPERATURE: 98 F

## 2022-09-20 LAB
ANION GAP SERPL CALC-SCNC: 16 MMOL/L — HIGH (ref 7–14)
BUN SERPL-MCNC: 8 MG/DL — SIGNIFICANT CHANGE UP (ref 7–23)
CALCIUM SERPL-MCNC: 9.9 MG/DL — SIGNIFICANT CHANGE UP (ref 8.4–10.5)
CHLORIDE SERPL-SCNC: 100 MMOL/L — SIGNIFICANT CHANGE UP (ref 98–107)
CO2 SERPL-SCNC: 20 MMOL/L — LOW (ref 22–31)
CREAT SERPL-MCNC: 0.77 MG/DL — SIGNIFICANT CHANGE UP (ref 0.5–1.3)
EGFR: 131 ML/MIN/1.73M2 — SIGNIFICANT CHANGE UP
GLUCOSE SERPL-MCNC: 105 MG/DL — HIGH (ref 70–99)
HCT VFR BLD CALC: 47.4 % — SIGNIFICANT CHANGE UP (ref 39–50)
HGB BLD-MCNC: 16 G/DL — SIGNIFICANT CHANGE UP (ref 13–17)
MAGNESIUM SERPL-MCNC: 2 MG/DL — SIGNIFICANT CHANGE UP (ref 1.6–2.6)
MCHC RBC-ENTMCNC: 27.4 PG — SIGNIFICANT CHANGE UP (ref 27–34)
MCHC RBC-ENTMCNC: 33.8 GM/DL — SIGNIFICANT CHANGE UP (ref 32–36)
MCV RBC AUTO: 81.2 FL — SIGNIFICANT CHANGE UP (ref 80–100)
NRBC # BLD: 0 /100 WBCS — SIGNIFICANT CHANGE UP (ref 0–0)
NRBC # FLD: 0 K/UL — SIGNIFICANT CHANGE UP (ref 0–0)
PHOSPHATE SERPL-MCNC: 4 MG/DL — SIGNIFICANT CHANGE UP (ref 2.5–4.5)
PLATELET # BLD AUTO: 275 K/UL — SIGNIFICANT CHANGE UP (ref 150–400)
POTASSIUM SERPL-MCNC: 3.8 MMOL/L — SIGNIFICANT CHANGE UP (ref 3.5–5.3)
POTASSIUM SERPL-SCNC: 3.8 MMOL/L — SIGNIFICANT CHANGE UP (ref 3.5–5.3)
RBC # BLD: 5.84 M/UL — HIGH (ref 4.2–5.8)
RBC # FLD: 13.5 % — SIGNIFICANT CHANGE UP (ref 10.3–14.5)
SODIUM SERPL-SCNC: 136 MMOL/L — SIGNIFICANT CHANGE UP (ref 135–145)
WBC # BLD: 9.09 K/UL — SIGNIFICANT CHANGE UP (ref 3.8–10.5)
WBC # FLD AUTO: 9.09 K/UL — SIGNIFICANT CHANGE UP (ref 3.8–10.5)

## 2022-09-20 PROCEDURE — 99239 HOSP IP/OBS DSCHRG MGMT >30: CPT

## 2022-09-20 RX ORDER — MERCAPTOPURINE 50 MG/1
1 TABLET ORAL
Qty: 30 | Refills: 0
Start: 2022-09-20 | End: 2022-10-19

## 2022-09-20 RX ORDER — MERCAPTOPURINE 50 MG/1
1.5 TABLET ORAL
Qty: 6.4 | Refills: 0
Start: 2022-09-20 | End: 2022-10-19

## 2022-09-20 RX ORDER — MERCAPTOPURINE 50 MG/1
1 TABLET ORAL
Qty: 0 | Refills: 0 | DISCHARGE

## 2022-09-20 RX ADMIN — POLYETHYLENE GLYCOL 3350 17 GRAM(S): 17 POWDER, FOR SOLUTION ORAL at 11:24

## 2022-09-20 RX ADMIN — MERCAPTOPURINE 50 MILLIGRAM(S): 50 TABLET ORAL at 11:24

## 2022-09-20 RX ADMIN — Medication 1 TABLET(S): at 11:24

## 2022-09-20 RX ADMIN — Medication 1 TABLET(S): at 06:35

## 2022-09-20 RX ADMIN — Medication 3 MILLIGRAM(S): at 00:14

## 2022-09-20 NOTE — PROGRESS NOTE ADULT - REASON FOR ADMISSION
Hallucination

## 2022-09-20 NOTE — DISCHARGE NOTE NURSING/CASE MANAGEMENT/SOCIAL WORK - NSDCFUADDAPPT_GEN_ALL_CORE_FT
Please call and follow up with PCP  Dr.David Camejo [phone]) in 1-2 weeks after discharge from the hospital.    Please call and follow up with Dr. Dela Cruz [phone]) within 1 - 2 weeks after discharge from the hospital.     Outpatient [facility] [phone]; if interested in comprehensive speech evaluation as suggested by our language team please call this number to establish an appointment

## 2022-09-20 NOTE — PROGRESS NOTE ADULT - PROBLEM SELECTOR PLAN 3
- d/w mom - mercaptopurine dose 50mg qd except 75mg on Saturdays, not been on while here, d/w GI ok to resume at usual dose, to f/u outpt with GI (mom rescheduling for visit in near future)  Dr. Maria Esther BLAKE at [facility]

## 2022-09-20 NOTE — PROGRESS NOTE ADULT - PROBLEM SELECTOR PLAN 5
Low risk for DVT.

## 2022-09-20 NOTE — PROGRESS NOTE ADULT - PROBLEM SELECTOR PLAN 4
Psych consult as above  - Family has been in progress of getting group home placement which was hoped to be completed 10/2022.   - SW/CM input for safe discharge recommendations.  - PT pt walking around, no longer needs rehab, will give script for outpt PT, with list of PT agencies that will do home PT  - per speech therapy - Patient will benefit from a comprehensive Speech/Language Evaluation and Therapy pending discharge (rehab facility vs home care vs outpatient [facility] [phone]).

## 2022-09-20 NOTE — PROGRESS NOTE ADULT - PROBLEM SELECTOR PROBLEM 3
Crohn's disease

## 2022-09-20 NOTE — PROGRESS NOTE ADULT - PROBLEM SELECTOR PLAN 2
Diarrhea is in the setting of bisacodyl and Miralax use for chronic constipation, suspect overflow vs laxative induced diarrhea--->>> resolved , WBC normal and afebrile  - CT A/P performed without acute pathology and exam benign  - GI PCR, C.Diff and stool culture/ O+P ordered but no diarrhea reported

## 2022-09-20 NOTE — DISCHARGE NOTE NURSING/CASE MANAGEMENT/SOCIAL WORK - PATIENT PORTAL LINK FT
You can access the FollowMyHealth Patient Portal offered by [facility] by registering at the following website: [email or web address]. By joining [employer]s FollowMyHealth portal, you will also be able to view your health information using other applications (apps) compatible with our system.

## 2022-09-20 NOTE — PROGRESS NOTE ADULT - ASSESSMENT
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

## 2022-09-20 NOTE — DISCHARGE NOTE NURSING/CASE MANAGEMENT/SOCIAL WORK - NSDCPEFALRISK_GEN_ALL_CORE
For information on Fall & Injury Prevention, visit: [email or web address] OR  [email or web address] OR  https://www.cdc.gov/steadi/patient.html

## 2022-09-20 NOTE — PROGRESS NOTE ADULT - SUBJECTIVE AND OBJECTIVE BOX
Patient is a 22y old  Male who presents with a chief complaint of Hallucination (19 Sep 2022 09:08)    SUBJECTIVE / OVERNIGHT EVENTS:  No problems reported over night. Walked around unit with patient. Asking to go home.     MEDICATIONS  (STANDING):  calcium carbonate   1250 mG (OsCal) 1 Tablet(s) Oral two times a day  influenza   Vaccine 0.5 milliLiter(s) IntraMuscular once  mercaptopurine (Non - oncologic) 50 milliGRAM(s) Oral <User Schedule>  mercaptopurine (Non - oncologic) 75 milliGRAM(s) Oral <User Schedule>  multivitamin 1 Tablet(s) Oral daily  polyethylene glycol 3350 17 Gram(s) Oral daily    MEDICATIONS  (PRN):  acetaminophen     Tablet .. 650 milliGRAM(s) Oral every 6 hours PRN Temp greater or equal to 38C (100.4F), Mild Pain (1 - 3)  aluminum hydroxide/magnesium hydroxide/simethicone Suspension 30 milliLiter(s) Oral every 6 hours PRN Dyspepsia  LORazepam     Tablet 0.5 milliGRAM(s) Oral every 6 hours PRN Agitation  melatonin 3 milliGRAM(s) Oral at bedtime PRN Insomnia    Vital Signs Last 24 Hrs  T(C): 36.7 (20 Sep 2022 06:30), Max: 36.9 (19 Sep 2022 21:05)  T(F): 98.1 (20 Sep 2022 06:30), Max: 98.4 (19 Sep 2022 21:05)  HR: 103 (20 Sep 2022 06:30) (94 - 103)  BP: 148/85 (20 Sep 2022 06:30) (118/78 - 148/85)  BP(mean): --  RR: 20 (20 Sep 2022 06:30) (18 - 20)  SpO2: 100% (20 Sep 2022 06:30) (100% - 100%)    Parameters below as of 20 Sep 2022 06:30  Patient On (Oxygen Delivery Method): room air    GENERAL: young autistic man, cooperative  CHEST/LUNG: No use of accessory muscles, CTAB, breathing non-labored  COR: RR, no mrcg  ABD: Soft, ND/NT, +BS  PSYCH: calm, cooperative  NEUROLOGY: walking in carbajal, steady  SKIN: No rashes or lesions  EXT: wwp, no cce    LABS:                        16.0   9.09  )-----------( 275      ( 20 Sep 2022 06:50 )             47.4     09-20    136  |  100  |  8   ----------------------------<  105<H>  3.8   |  20<L>  |  0.77    Ca    9.9      20 Sep 2022 06:50  Phos  4.0     09-20  Mg     2.00     09-20    RADIOLOGY & ADDITIONAL TESTS:    Imaging Personally Reviewed:    Consultant(s) Notes Reviewed:      Care Discussed with Consultants/Other Providers: RN, SW, CM, ACP re overall care

## 2022-09-20 NOTE — PROGRESS NOTE ADULT - NSPROGADDITIONALINFOA_GEN_ALL_CORE
pt walking in carbajal, no need for rehab, will give script for home PT    Spent 35 minutes counseling and coordinating discharge care.

## 2022-09-20 NOTE — PROGRESS NOTE ADULT - PROBLEM SELECTOR PROBLEM 1
Bipolar disorder

## 2022-09-20 NOTE — PROGRESS NOTE ADULT - PROBLEM SELECTOR PLAN 1
On admission reported increase in visual and auditory hallucination and behavioral disturbances relating to his grandmother being in hospital and about Abilify   --->>  appreciate psych input - pt has stabilized. Mother declines antipsychotics or other standing meds. Mom has worked hard to secure group home placement for pt in mid October and has also set him up with outpt psych at [facility] in [place]. Pt doing well, mother arranged for pt's father to pick him up this afternoon.

## 2022-10-13 NOTE — PROVIDER CONTACT NOTE (OTHER) - ASSESSMENT
Pt becoming combative with staff, throwing self on floor despite repeated re-direction.
Exacerbation of systemic lupus

## 2023-05-10 PROBLEM — F31.9 BIPOLAR DISORDER, UNSPECIFIED: Chronic | Status: ACTIVE | Noted: 2022-09-12

## 2023-05-30 NOTE — OCCUPATIONAL THERAPY INITIAL EVALUATION ADULT - WEIGHT-BEARING RESTRICTIONS: STAND/SIT, REHAB EVAL
weight-bearing as tolerated Dapsone Pregnancy And Lactation Text: This medication is Pregnancy Category C and is not considered safe during pregnancy or breast feeding.

## 2024-03-12 ENCOUNTER — APPOINTMENT (OUTPATIENT)
Age: 24
End: 2024-03-12
Payer: MEDICAID

## 2024-03-12 PROCEDURE — ZZZZZ: CPT

## 2025-04-01 ENCOUNTER — APPOINTMENT (OUTPATIENT)
Age: 25
End: 2025-04-01
Payer: MEDICAID

## 2025-04-01 PROCEDURE — ZZZZZ: CPT

## 2025-05-20 NOTE — ED ADULT NURSE NOTE - ALCOHOL PRE SCREEN (AUDIT - C)
Statement Selected [Alert] : alert [No Acute Distress] : no acute distress [Playful] : playful [Normocephalic] : normocephalic [Conjunctivae with no discharge] : conjunctivae with no discharge [PERRL] : PERRL [EOMI Bilateral] : EOMI bilateral [Auricles Well Formed] : auricles well formed [No Discharge] : no discharge [Nares Patent] : nares patent [Pink Nasal Mucosa] : pink nasal mucosa [Palate Intact] : palate intact [Uvula Midline] : uvula midline [Nonerythematous Oropharynx] : nonerythematous oropharynx [No Caries] : no caries [Trachea Midline] : trachea midline [Supple, full passive range of motion] : supple, full passive range of motion [No Palpable Masses] : no palpable masses [Symmetric Chest Rise] : symmetric chest rise [Clear to Auscultation Bilaterally] : clear to auscultation bilaterally [Normoactive Precordium] : normoactive precordium [Regular Rate and Rhythm] : regular rate and rhythm [Normal S1, S2 present] : normal S1, S2 present [No Murmurs] : no murmurs [+2 Femoral Pulses] : +2 femoral pulses [Soft] : soft [NonTender] : non tender [Non Distended] : non distended [Normoactive Bowel Sounds] : normoactive bowel sounds [No Hepatomegaly] : no hepatomegaly [No Splenomegaly] : no splenomegaly [Serge 1] : Serge 1 [Central Urethral Opening] : central urethral opening [Testicles Descended Bilaterally] : testicles descended bilaterally [Patent] : patent [Normally Placed] : normally placed [No Abnormal Lymph Nodes Palpated] : no abnormal lymph nodes palpated [Symmetric Buttocks Creases] : symmetric buttocks creases [Symmetric Hip Rotation] : symmetric hip rotation [No Gait Asymmetry] : no gait asymmetry [No pain or deformities with palpation of bone, muscles, joints] : no pain or deformities with palpation of bone, muscles, joints [Normal Muscle Tone] : normal muscle tone [No Spinal Dimple] : no spinal dimple [NoTuft of Hair] : no tuft of hair [Straight] : straight [+2 Patella DTR] : +2 patella DTR [Cranial Nerves Grossly Intact] : cranial nerves grossly intact [No Rash or Lesions] : no rash or lesions [FreeTextEntry3] : L- impacted wax, R-mild wax TM clear